# Patient Record
Sex: FEMALE | Race: WHITE | Employment: UNEMPLOYED | URBAN - METROPOLITAN AREA
[De-identification: names, ages, dates, MRNs, and addresses within clinical notes are randomized per-mention and may not be internally consistent; named-entity substitution may affect disease eponyms.]

---

## 2018-08-14 ENCOUNTER — HOSPITAL ENCOUNTER (EMERGENCY)
Age: 38
Discharge: HOME | End: 2018-08-14
Attending: OBSTETRICS & GYNECOLOGY | Admitting: OBSTETRICS & GYNECOLOGY
Payer: COMMERCIAL

## 2018-08-14 VITALS
BODY MASS INDEX: 33.74 KG/M2 | SYSTOLIC BLOOD PRESSURE: 124 MMHG | WEIGHT: 215 LBS | DIASTOLIC BLOOD PRESSURE: 82 MMHG | RESPIRATION RATE: 18 BRPM | HEART RATE: 100 BPM | TEMPERATURE: 98.1 F | HEIGHT: 67 IN

## 2018-08-14 PROBLEM — R61 SWEATING PROFUSELY: Status: ACTIVE | Noted: 2018-08-14

## 2018-08-14 LAB
ALBUMIN SERPL-MCNC: 2.9 G/DL (ref 3.5–5)
ALBUMIN/GLOB SERPL: 0.7 {RATIO} (ref 1.2–3.5)
ALP SERPL-CCNC: 93 U/L (ref 50–136)
ALT SERPL-CCNC: 21 U/L (ref 12–65)
ANION GAP SERPL CALC-SCNC: 10 MMOL/L (ref 7–16)
AST SERPL-CCNC: 13 U/L (ref 15–37)
BILIRUB SERPL-MCNC: 0.1 MG/DL (ref 0.2–1.1)
BUN SERPL-MCNC: 9 MG/DL (ref 6–23)
CALCIUM SERPL-MCNC: 9.1 MG/DL (ref 8.3–10.4)
CHLORIDE SERPL-SCNC: 104 MMOL/L (ref 98–107)
CO2 SERPL-SCNC: 20 MMOL/L (ref 21–32)
CREAT SERPL-MCNC: 0.74 MG/DL (ref 0.6–1)
ERYTHROCYTE [DISTWIDTH] IN BLOOD BY AUTOMATED COUNT: 14.4 %
GLOBULIN SER CALC-MCNC: 4.4 G/DL (ref 2.3–3.5)
GLUCOSE SERPL-MCNC: 94 MG/DL (ref 65–100)
GLUCOSE, GLUUPC: NEGATIVE
HCT VFR BLD AUTO: 38.9 % (ref 35.8–46.3)
HGB BLD-MCNC: 12.3 G/DL (ref 11.7–15.4)
KETONES UR-MCNC: NEGATIVE MG/DL
MCH RBC QN AUTO: 28.9 PG (ref 26.1–32.9)
MCHC RBC AUTO-ENTMCNC: 31.6 G/DL (ref 31.4–35)
MCV RBC AUTO: 91.5 FL (ref 79.6–97.8)
NRBC # BLD: 0 K/UL (ref 0–0.2)
PLATELET # BLD AUTO: 194 K/UL (ref 150–450)
PMV BLD AUTO: 10.4 FL (ref 9.4–12.3)
POTASSIUM SERPL-SCNC: 3.7 MMOL/L (ref 3.5–5.1)
PROT SERPL-MCNC: 7.3 G/DL (ref 6.3–8.2)
PROT UR QL: NEGATIVE
RBC # BLD AUTO: 4.25 M/UL (ref 4.05–5.2)
SODIUM SERPL-SCNC: 134 MMOL/L (ref 136–145)
WBC # BLD AUTO: 15.6 K/UL (ref 4.3–11.1)

## 2018-08-14 PROCEDURE — 74011250637 HC RX REV CODE- 250/637: Performed by: OBSTETRICS & GYNECOLOGY

## 2018-08-14 PROCEDURE — 87086 URINE CULTURE/COLONY COUNT: CPT

## 2018-08-14 PROCEDURE — 80053 COMPREHEN METABOLIC PANEL: CPT

## 2018-08-14 PROCEDURE — 81002 URINALYSIS NONAUTO W/O SCOPE: CPT | Performed by: OBSTETRICS & GYNECOLOGY

## 2018-08-14 PROCEDURE — 85027 COMPLETE CBC AUTOMATED: CPT

## 2018-08-14 PROCEDURE — 59025 FETAL NON-STRESS TEST: CPT

## 2018-08-14 PROCEDURE — 99285 EMERGENCY DEPT VISIT HI MDM: CPT

## 2018-08-14 RX ORDER — OXYCODONE HYDROCHLORIDE 5 MG/1
10 TABLET ORAL
Status: COMPLETED | OUTPATIENT
Start: 2018-08-14 | End: 2018-08-14

## 2018-08-14 RX ORDER — LORATADINE 10 MG/1
10 TABLET ORAL
COMMUNITY

## 2018-08-14 RX ORDER — CALCIUM CARBONATE 200(500)MG
1 TABLET,CHEWABLE ORAL DAILY
COMMUNITY

## 2018-08-14 RX ORDER — ACETAMINOPHEN 500 MG
1000 TABLET ORAL
COMMUNITY

## 2018-08-14 RX ADMIN — OXYCODONE HYDROCHLORIDE 10 MG: 5 TABLET ORAL at 20:10

## 2018-08-14 NOTE — IP AVS SNAPSHOT
Summary of Care Report The Summary of Care report has been created to help improve care coordination. Users with access to [employer] or [address] (Web-based application) may access additional patient information including the Discharge Summary. If you are not currently a [address] user and need more information, please call the number listed below in the [place] [address] section and ask to be connected with Medical Records. Facility Information Name Address Phone [address] [place] [address] [address] [place] [id number] [phone] Patient Information Patient Name Sex NEAL Diallo ([id number]) Female 1980 Discharge Information Admitting Provider Service Area Unit Nat Payne MD / [address] [facility] 4 Dimitry / [phone] Discharge Provider Discharge Date/Time Discharge Disposition Destination (none) (none) (none) (none) Patient Language Language ENGLISH [13] Hospital Problems as of 2018  Reviewed: 2018  6:27 PM by Nat Payne MD  
  
  
  
 Class Noted - Resolved Last Modified POA Active Problems Sweating profusely  2018 - Present 2018 by Nat Payne MD Unknown Entered by Nat Payne MD  
  
Non-Hospital Problems as of 2018  Reviewed: 2018  6:27 PM by Nat Payne MD  
 None You are allergic to the following Not on File Current Discharge Medication List  
  
ASK your doctor about these medications Dose & Instructions Dispensing Information Comments  
 calcium carbonate 200 mg calcium (500 mg) Chew Commonly known as:  TUMS Dose:  1 Tab Take 1 Tab by mouth daily. Refills:  0 CLARITIN 10 mg tablet Generic drug:  loratadine Dose:  10 mg Take 10 mg by mouth. Refills:  0 TYLENOL EXTRA STRENGTH 500 mg tablet Generic drug:  acetaminophen Dose:  1000 mg Take 1,000 mg by mouth every six (6) hours as needed for Pain. Refills:  0 Follow-up Information Follow up With Details Comments Contact [facility]  If symptoms worsen [address] 
[address] [place] [address] [phone] Discharge Instructions Pregnancy Precautions: Care Instructions Your Care Instructions There is no sure way to prevent labor before your due date ( labor) or to prevent most other pregnancy problems. But there are things you can do to increase your chances of a healthy pregnancy. Go to your appointments, follow your doctor's advice, and take good care of yourself. Eat well, and exercise (if your doctor agrees). And make sure to drink plenty of water. Follow-up care is a key part of your treatment and safety. Be sure to make and go to all appointments, and call your doctor if you are having problems. It's also a good idea to know your test results and keep a list of the medicines you take. How can you care for yourself at home? · Make sure you go to your prenatal appointments. At each visit, your doctor will check your blood pressure. Your doctor will also check to see if you have protein in your urine. High blood pressure and protein in urine are signs of preeclampsia. This condition can be dangerous for you and your baby. · Drink plenty of fluids, enough so that your urine is light yellow or clear like water. Dehydration can cause contractions. If you have kidney, heart, or liver disease and have to limit fluids, talk with your doctor before you increase the amount of fluids you drink. · Tell your doctor right away if you notice any symptoms of an infection, such as: ¨ Burning when you urinate. ¨ A foul-smelling discharge from your vagina. ¨ Vaginal itching. ¨ Unexplained fever. ¨ Unusual pain or soreness in your uterus or lower belly. · Eat a balanced diet. Include plenty of foods that are high in calcium and iron. ¨ Foods high in calcium include milk, cheese, yogurt, almonds, and broccoli. ¨ Foods high in iron include red meat, shellfish, poultry, eggs, beans, raisins, whole-grain bread, and leafy green vegetables. · Do not smoke. If you need help quitting, talk to your doctor about stop-smoking programs and medicines. These can increase your chances of quitting for good. · Do not drink alcohol or use illegal drugs. · Follow your doctor's directions about activity. Your doctor will let you know how much, if any, exercise you can do. · Ask your doctor if you can have sex. If you are at risk for early labor, your doctor may ask you to not have sex. · Take care to prevent falls. During pregnancy, your joints are loose, and your balance is off. Sports such as bicycling, skiing, or in-line skating can increase your risk of falling. And don't ride horses or motorcycles, dive, water ski, scuba dive, or parachute jump while you are pregnant. · Avoid getting very hot. Do not use saunas or hot tubs. Avoid staying out in the sun in hot weather for long periods. Take acetaminophen (Tylenol) to lower a high fever. · Do not take any over-the-counter or herbal medicines or supplements without talking to your doctor or pharmacist first. 
When should you call for help? Call 911 anytime you think you may need emergency care. For example, call if: 
  · You passed out (lost consciousness).  
  · You have severe vaginal bleeding.  
  · You have severe pain in your belly or pelvis.  
  · You have had fluid gushing or leaking from your vagina and you know or think the umbilical cord is bulging into your vagina. If this happens, immediately get down on your knees so your rear end (buttocks) is higher than your head. This will decrease the pressure on the cord until help arrives.  Call your doctor now or seek immediate medical care if: 
  · You have signs of preeclampsia, such as: 
¨ Sudden swelling of your face, hands, or feet. ¨ New vision problems (such as dimness or blurring). ¨ A severe headache.  
  · You have any vaginal bleeding.  
  · You have belly pain or cramping.  
  · You have a fever.  
  · You have had regular contractions (with or without pain) for an hour. This means that you have 8 or more within 1 hour or 4 or more in 20 minutes after you change your position and drink fluids.  
  · You have a sudden release of fluid from your vagina.  
  · You have low back pain or pelvic pressure that does not go away.  
  · You notice that your baby has stopped moving or is moving much less than normal.  
 Watch closely for changes in your health, and be sure to contact your doctor if you have any problems. Where can you learn more? Go to http://fredi-desean.info/. Enter [id number] in the search box to learn more about \"Pregnancy Precautions: Care Instructions. \" Current as of: November 21, 2017 Content Version: 11.7 © 7711-0166 [employer]. Care instructions adapted under license by [employer] (which disclaims liability or warranty for this information). If you have questions about a medical condition or this instruction, always ask your healthcare professional. [place] [address] any warranty or liability for your use of this information. Chart Review Routing History No Routing History on File

## 2018-08-14 NOTE — PROGRESS NOTES
Pt here with complaints of \"not feeling well\" pt states she has been having contractions and cramping; headache; some upper belly pain; pt vague when describing these symptoms; will notify MD of pt arrival.

## 2018-08-14 NOTE — IP AVS SNAPSHOT
[address] 
[phone] Patient: Yasemin Paiz 
MRN: [id number] About your hospitalization You were admitted on:  N/A You last received care in the:  [facility] 4 TAI You were discharged on:  2018 Why you were hospitalized Your primary diagnosis was:  Not on File Your diagnoses also included:  Sweating Profusely Follow-up Information Follow up With Details Comments Contact [facility]  If symptoms worsen [facility]  
[address] [place] [address] [phone] Discharge Orders None A check alden indicates which time of day the medication should be taken. My Medications ASK your doctor about these medications Instructions Each Dose to Equal  
 Morning Noon Evening Bedtime  
 calcium carbonate 200 mg calcium (500 mg) Chew Commonly known as:  TUMS Your last dose was: Your next dose is: Take 1 Tab by mouth daily. 1 Tab CLARITIN 10 mg tablet Generic drug:  loratadine Your last dose was: Your next dose is: Take 10 mg by mouth. 10 mg  
    
   
   
   
  
 TYLENOL EXTRA STRENGTH 500 mg tablet Generic drug:  acetaminophen Your last dose was: Your next dose is: Take 1,000 mg by mouth every six (6) hours as needed for Pain. 1000 mg Discharge Instructions Pregnancy Precautions: Care Instructions Your Care Instructions There is no sure way to prevent labor before your due date ( labor) or to prevent most other pregnancy problems. But there are things you can do to increase your chances of a healthy pregnancy. Go to your appointments, follow your doctor's advice, and take good care of yourself. Eat well, and exercise (if your doctor agrees). And make sure to drink plenty of water. Follow-up care is a key part of your treatment and safety. Be sure to make and go to all appointments, and call your doctor if you are having problems. It's also a good idea to know your test results and keep a list of the medicines you take. How can you care for yourself at home? · Make sure you go to your prenatal appointments. At each visit, your doctor will check your blood pressure. Your doctor will also check to see if you have protein in your urine. High blood pressure and protein in urine are signs of preeclampsia. This condition can be dangerous for you and your baby. · Drink plenty of fluids, enough so that your urine is light yellow or clear like water. Dehydration can cause contractions. If you have kidney, heart, or liver disease and have to limit fluids, talk with your doctor before you increase the amount of fluids you drink. · Tell your doctor right away if you notice any symptoms of an infection, such as: ¨ Burning when you urinate. ¨ A foul-smelling discharge from your vagina. ¨ Vaginal itching. ¨ Unexplained fever. ¨ Unusual pain or soreness in your uterus or lower belly. · Eat a balanced diet. Include plenty of foods that are high in calcium and iron. ¨ Foods high in calcium include milk, cheese, yogurt, almonds, and broccoli. ¨ Foods high in iron include red meat, shellfish, poultry, eggs, beans, raisins, whole-grain bread, and leafy green vegetables. · Do not smoke. If you need help quitting, talk to your doctor about stop-smoking programs and medicines. These can increase your chances of quitting for good. · Do not drink alcohol or use illegal drugs. · Follow your doctor's directions about activity. Your doctor will let you know how much, if any, exercise you can do. · Ask your doctor if you can have sex. If you are at risk for early labor, your doctor may ask you to not have sex. · Take care to prevent falls. During pregnancy, your joints are loose, and your balance is off. Sports such as bicycling, skiing, or in-line skating can increase your risk of falling. And don't ride horses or motorcycles, dive, water ski, scuba dive, or parachute jump while you are pregnant. · Avoid getting very hot. Do not use saunas or hot tubs. Avoid staying out in the sun in hot weather for long periods. Take acetaminophen (Tylenol) to lower a high fever. · Do not take any over-the-counter or herbal medicines or supplements without talking to your doctor or pharmacist first. 
When should you call for help? Call 911 anytime you think you may need emergency care. For example, call if: 
  · You passed out (lost consciousness).  
  · You have severe vaginal bleeding.  
  · You have severe pain in your belly or pelvis.  
  · You have had fluid gushing or leaking from your vagina and you know or think the umbilical cord is bulging into your vagina. If this happens, immediately get down on your knees so your rear end (buttocks) is higher than your head. This will decrease the pressure on the cord until help arrives.  
[facility] your doctor now or seek immediate medical care if: 
  · You have signs of preeclampsia, such as: 
¨ Sudden swelling of your face, hands, or feet. ¨ New vision problems (such as dimness or blurring). ¨ A severe headache.  
  · You have any vaginal bleeding.  
  · You have belly pain or cramping.  
  · You have a fever.  
  · You have had regular contractions (with or without pain) for an hour.  This means that you have 8 or more within 1 hour or 4 or more in 20 minutes after you change your position and drink fluids.  
  · You have a sudden release of fluid from your vagina.  
  · You have low back pain or pelvic pressure that does not go away.  
  · You notice that your baby has stopped moving or is moving much less than normal.  
  Watch closely for changes in your health, and be sure to contact your doctor if you have any problems. Where can you learn more? Go to http://fredi-desean.info/. Enter [id number] in the search box to learn more about \"Pregnancy Precautions: Care Instructions. \" Current as of: November 21, 2017 Content Version: 11.7 © 9921-3588 [employer]. Care instructions adapted under license by [employer] (which disclaims liability or warranty for this information). If you have questions about a medical condition or this instruction, always ask your healthcare professional. [address] any warranty or liability for your use of this information. Introducing [facility]! [facility] introduces [employer] patient portal. Now you can access parts of your medical record, email your doctor's office, and request medication refills online. 1. In your internet browser, go to [email or web address] [email or web address] 2. Click on the First Time User? Click Here link in the Sign In box. You will see the New Member Sign Up page. 3. Enter your [employer] Access Code exactly as it appears below. You will not need to use this code after youve completed the sign-up process. If you do not sign up before the expiration date, you must request a new code. · [employer] Access Code: [email or web address] Expires: 11/12/2018  7:35 PM 
 
4. Enter the last four digits of your Social Security Number (xxxx) and Date of Birth (mm/dd/yyyy) as indicated and click Submit. You will be taken to the next sign-up page. 5. Create a [employer] ID. This will be your [employer] login ID and cannot be changed, so think of one that is secure and easy to remember. 6. Create a [employer] password. You can change your password at any time. 7. Enter your Password Reset Question and Answer. This can be used at a later time if you forget your password. 8. Enter your e-mail address. You will receive e-mail notification when new information is available in [address] 9. Click Sign Up. You can now view and download portions of your medical record. 10. Click the Download Summary menu link to download a portable copy of your medical information. If you have questions, please visit the Frequently Asked Questions section of the [employer] website. Remember, [employer] is NOT to be used for urgent needs. For medical emergencies, dial 911. Now available from your iPhone and Android! Introducing Flip George As a [employer] patient, I wanted to make you aware of our electronic visit tool called Flip George. SoWeTrip/7 allows you to connect within minutes with a medical provider 24 hours a day, seven days a week via a mobile device or tablet or logging into a secure website from your computer. You can access Flip George from anywhere in the [place]. A virtual visit might be right for you when you have a simple condition and feel like you just dont want to get out of bed, or cant get away from work for an appointment, when your regular [facility] provider is not available (evenings, weekends or holidays), or when youre out of town and need minor care. Electronic visits cost only $49 and if the [employer] provider determines a prescription is needed to treat your condition, one can be electronically transmitted to a nearby pharmacy*. Please take a moment to enroll today if you have not already done so. The enrollment process is free and takes just a few minutes. To enroll, please download the [employer] eddie to your tablet or phone, or visit [email or web address] to enroll on your computer.    
And, as an [address] [place] [address] patient with a [email or web address] account, the results of your visits will be scanned into your electronic medical record and your primary care provider will be able to view the scanned results. We urge you to continue to see your regular [employer] provider for your ongoing medical care. And while your primary care provider may not be the one available when you seek a Flip George virtual visit, the peace of mind you get from getting a real diagnosis real time can be priceless. For more information on Flip George, view our Frequently Asked Questions (FAQs) at [email or web address]. Sincerely, 
 
Ritu Morgan MD 
Chief Medical Officer [facility] Yuridia Schofield *:  certain medications cannot be prescribed via Flip George Providers Seen During Your Hospitalization Provider Specialty Primary office phone Martha Hopkins MD Obstetrics & Gynecology [phone] Your Primary Care Physician (PCP) Primary Care Physician Office Phone Office Fax UNKNOWN, PROVIDER ** None ** ** None ** You are allergic to the following Not on File Recent Documentation Height Weight BMI OB Status Smoking Status 1.702 m 97.5 kg 33.67 kg/m2 Pregnant Never Smoker Patient Belongings The following personal items are in your possession at time of discharge: 
                             
 
  
  
 Please provide this summary of care documentation to your next provider. Signatures-by signing, you are acknowledging that this After Visit Summary has been reviewed with you and you have received a copy. Patient Signature:  ____________________________________________________________ Date:  ____________________________________________________________  
  
Patrick Patel Provider Signature:  ____________________________________________________________ Date:  ____________________________________________________________

## 2018-08-14 NOTE — IP AVS SNAPSHOT
[address] 
[phone] Patient: Bharti Ambriz 
MRN: [id number] XCK:[birth year] A check alden indicates which time of day the medication should be taken. My Medications ASK your doctor about these medications Instructions Each Dose to Equal  
 Morning Noon Evening Bedtime  
 calcium carbonate 200 mg calcium (500 mg) Chew Commonly known as:  TUMS Your last dose was: Your next dose is: Take 1 Tab by mouth daily. 1 Tab CLARITIN 10 mg tablet Generic drug:  loratadine Your last dose was: Your next dose is: Take 10 mg by mouth. 10 mg  
    
   
   
   
  
 TYLENOL EXTRA STRENGTH 500 mg tablet Generic drug:  acetaminophen Your last dose was: Your next dose is: Take 1,000 mg by mouth every six (6) hours as needed for Pain.   
 1000 mg

## 2018-08-14 NOTE — H&P
Chief Complaint   Patient presents with    Pregnancy Problem     31w6d       41 y.o. female at 31w6d  weeks gestation who is seen for not feeling well. States she has been having abdominal cramping? Pain? For several days. That it usually goes away with drinking more fluid. Has heartburn, but not right now. Urinates frequently. Was walking through store and felt sweaty and lightheaded so came to be seen. No fever/vomiting/headache/vision changes/vag bleeding. Appetite and bm fine. Fetal movement has beennormal .    HISTORY:  OB History    Para Term  AB Living   2    1    SAB TAB Ectopic Molar Multiple Live Births              # Outcome Date GA Lbr Bro/2nd Weight Sex Delivery Anes PTL Lv   2 Current            1 AB                   History   Sexual Activity    Sexual activity: Yes     No LMP recorded. Patient is pregnant. Social History     Social History    Marital status: UNKNOWN     Spouse name: N/A    Number of children: N/A    Years of education: N/A     Occupational History    Not on file. Social History Main Topics    Smoking status: Never Smoker    Smokeless tobacco: Never Used    Alcohol use No    Drug use: No    Sexual activity: Yes     Other Topics Concern    Not on file     Social History Narrative    No narrative on file       History reviewed. No pertinent surgical history. History reviewed. No pertinent past medical history. ROS:  Negative:   negative 10 point ROS except as noted in HPI    Positive:   per hpi    PHYSICAL EXAM:  Blood pressure 139/90, pulse (!) 107, temperature 98.1 °F (36.7 °C), resp. rate 18, height 5' 7\" (1.702 m), weight 97.5 kg (215 lb). The patient appears well, alert, oriented x 3. Appropriate affect. Lungs are clear. Heart RRR, no murmurs. Abdomen soft, on my exam was ttp in ruq, when checked by nurses was tender luq, no rebound/guarding. no cvat.   Fundus soft and non tender  Skin warm, dry, no rashes  Ext 1 + edema, DTR's normal    Cervix: long/closed/high    Fetal Heart Rate: cat 1 tracing   Uterine contractions: rare    Urine dip clear, completelly negative    Assessment:  41 y.o. female at 32w8d, vague complaints    Plan:  Check cbc/comp met. Monitor bp. Eboni Johnson MD    Lab Results   Component Value Date/Time    Sodium 134 (L) 08/14/2018 06:49 PM    Potassium 3.7 08/14/2018 06:49 PM    Chloride 104 08/14/2018 06:49 PM    CO2 20 (L) 08/14/2018 06:49 PM    Anion gap 10 08/14/2018 06:49 PM    Glucose 94 08/14/2018 06:49 PM    BUN 9 08/14/2018 06:49 PM    Creatinine 0.74 08/14/2018 06:49 PM    GFR est AA >60 08/14/2018 06:49 PM    GFR est non-AA >60 08/14/2018 06:49 PM    Calcium 9.1 08/14/2018 06:49 PM    Bilirubin, total 0.1 (L) 08/14/2018 06:49 PM    AST (SGOT) 13 (L) 08/14/2018 06:49 PM    Alk. phosphatase 93 08/14/2018 06:49 PM    Protein, total 7.3 08/14/2018 06:49 PM    Albumin 2.9 (L) 08/14/2018 06:49 PM    Globulin 4.4 (H) 08/14/2018 06:49 PM    A-G Ratio 0.7 (L) 08/14/2018 06:49 PM    ALT (SGPT) 21 08/14/2018 06:49 PM     Lab Results   Component Value Date/Time    WBC 15.6 (H) 08/14/2018 06:49 PM    HGB 12.3 08/14/2018 06:49 PM    HCT 38.9 08/14/2018 06:49 PM    PLATELET 891 20/22/8407 06:49 PM    MCV 91.5 08/14/2018 06:49 PM     Discussed results with patient. Wbc count slightly elevated but otherwise labs essentially normal. Reviewed symptoms again with patient. Other than frequent urination, nothing focal. Will send urine culture. Patient cautioned to monitor for fever, and return for new or worsening sx. Discharged with oxycodone 10 given here. Eboni Johnson MD

## 2018-08-15 NOTE — PROGRESS NOTES
patient discharged home self care. Patient verbalizes understanding of discharge instructions. Patient ambulated out with spouse.

## 2018-08-17 LAB
BACTERIA SPEC CULT: NORMAL
SERVICE CMNT-IMP: NORMAL

## 2018-10-04 ENCOUNTER — HOSPITAL ENCOUNTER (INPATIENT)
Age: 38
LOS: 4 days | Discharge: HOME | End: 2018-10-08
Attending: OBSTETRICS & GYNECOLOGY | Admitting: OBSTETRICS & GYNECOLOGY
Payer: COMMERCIAL

## 2018-10-04 DIAGNOSIS — G89.18 POSTOPERATIVE PAIN: Primary | ICD-10-CM

## 2018-10-04 PROBLEM — Z34.90 ENCOUNTER FOR PLANNED INDUCTION OF LABOR: Status: ACTIVE | Noted: 2018-10-04

## 2018-10-04 PROBLEM — Z3A.39 39 WEEKS GESTATION OF PREGNANCY: Status: ACTIVE | Noted: 2018-10-04

## 2018-10-04 LAB
ABO + RH BLD: NORMAL
BLOOD GROUP ANTIBODIES SERPL: NORMAL
ERYTHROCYTE [DISTWIDTH] IN BLOOD BY AUTOMATED COUNT: 15.3 %
HCT VFR BLD AUTO: 40.4 % (ref 35.8–46.3)
HGB BLD-MCNC: 12.7 G/DL (ref 11.7–15.4)
MCH RBC QN AUTO: 28.8 PG (ref 26.1–32.9)
MCHC RBC AUTO-ENTMCNC: 31.4 G/DL (ref 31.4–35)
MCV RBC AUTO: 91.6 FL (ref 79.6–97.8)
NRBC # BLD: 0 K/UL (ref 0–0.2)
PLATELET # BLD AUTO: 167 K/UL (ref 150–450)
PMV BLD AUTO: 10.6 FL (ref 9.4–12.3)
RBC # BLD AUTO: 4.41 M/UL (ref 4.05–5.2)
SPECIMEN EXP DATE BLD: NORMAL
WBC # BLD AUTO: 9.7 K/UL (ref 4.3–11.1)

## 2018-10-04 PROCEDURE — 74011250637 HC RX REV CODE- 250/637: Performed by: OBSTETRICS & GYNECOLOGY

## 2018-10-04 PROCEDURE — 86901 BLOOD TYPING SEROLOGIC RH(D): CPT

## 2018-10-04 PROCEDURE — 85027 COMPLETE CBC AUTOMATED: CPT

## 2018-10-04 PROCEDURE — 65270000029 HC RM PRIVATE

## 2018-10-04 PROCEDURE — 4A1HXCZ MONITORING OF PRODUCTS OF CONCEPTION, CARDIAC RATE, EXTERNAL APPROACH: ICD-10-PCS | Performed by: OBSTETRICS & GYNECOLOGY

## 2018-10-04 RX ORDER — SODIUM CHLORIDE 0.9 % (FLUSH) 0.9 %
5-10 SYRINGE (ML) INJECTION EVERY 8 HOURS
Status: DISCONTINUED | OUTPATIENT
Start: 2018-10-04 | End: 2018-10-07 | Stop reason: ALTCHOICE

## 2018-10-04 RX ORDER — OXYTOCIN/RINGER'S LACTATE 30/500 ML
0-25 PLASTIC BAG, INJECTION (ML) INTRAVENOUS
Status: DISCONTINUED | OUTPATIENT
Start: 2018-10-04 | End: 2018-10-04

## 2018-10-04 RX ORDER — ZOLPIDEM TARTRATE 5 MG/1
5 TABLET ORAL
Status: DISCONTINUED | OUTPATIENT
Start: 2018-10-04 | End: 2018-10-08 | Stop reason: HOSPADM

## 2018-10-04 RX ORDER — LIDOCAINE HYDROCHLORIDE 20 MG/ML
JELLY TOPICAL
Status: ACTIVE | OUTPATIENT
Start: 2018-10-04 | End: 2018-10-05

## 2018-10-04 RX ORDER — SODIUM CHLORIDE 0.9 % (FLUSH) 0.9 %
5-10 SYRINGE (ML) INJECTION AS NEEDED
Status: DISCONTINUED | OUTPATIENT
Start: 2018-10-04 | End: 2018-10-07 | Stop reason: ALTCHOICE

## 2018-10-04 RX ORDER — LIDOCAINE HYDROCHLORIDE 10 MG/ML
1 INJECTION INFILTRATION; PERINEURAL
Status: ACTIVE | OUTPATIENT
Start: 2018-10-04 | End: 2018-10-05

## 2018-10-04 RX ORDER — OXYTOCIN/RINGER'S LACTATE 30/500 ML
0-25 PLASTIC BAG, INJECTION (ML) INTRAVENOUS
Status: DISCONTINUED | OUTPATIENT
Start: 2018-10-05 | End: 2018-10-05 | Stop reason: HOSPADM

## 2018-10-04 RX ORDER — OXYTOCIN/0.9 % SODIUM CHLORIDE 15/250 ML
250 PLASTIC BAG, INJECTION (ML) INTRAVENOUS ONCE
Status: ACTIVE | OUTPATIENT
Start: 2018-10-04 | End: 2018-10-05

## 2018-10-04 RX ORDER — BUTORPHANOL TARTRATE 1 MG/ML
1 INJECTION INTRAMUSCULAR; INTRAVENOUS
Status: DISCONTINUED | OUTPATIENT
Start: 2018-10-04 | End: 2018-10-05 | Stop reason: HOSPADM

## 2018-10-04 RX ORDER — MINERAL OIL
120 OIL (ML) ORAL
Status: ACTIVE | OUTPATIENT
Start: 2018-10-04 | End: 2018-10-05

## 2018-10-04 RX ORDER — DEXTROSE, SODIUM CHLORIDE, SODIUM LACTATE, POTASSIUM CHLORIDE, AND CALCIUM CHLORIDE 5; .6; .31; .03; .02 G/100ML; G/100ML; G/100ML; G/100ML; G/100ML
125 INJECTION, SOLUTION INTRAVENOUS CONTINUOUS
Status: DISCONTINUED | OUTPATIENT
Start: 2018-10-04 | End: 2018-10-08 | Stop reason: ALTCHOICE

## 2018-10-04 RX ADMIN — MISOPROSTOL 50 MCG: 100 TABLET ORAL at 23:36

## 2018-10-04 RX ADMIN — MISOPROSTOL 25 MCG: 100 TABLET ORAL at 19:41

## 2018-10-04 RX ADMIN — ZOLPIDEM TARTRATE 5 MG: 5 TABLET ORAL at 23:36

## 2018-10-04 NOTE — PROGRESS NOTES
10/04/18 1936 Cervical Exam  
Dilation (cm) 0 (closed) Eff 30 % Cervical Consistency Moderate Vaginal exam done by? Yahir Nguyen RN Initial dose of cytotec 25mcg administered.

## 2018-10-04 NOTE — IP AVS SNAPSHOT
[id number]  [address] 
[phone] Patient: Jaime Freedman MRN: [id number] XKO:[id number] About your hospitalization You were admitted on:  October 4, 2018 You last received care in the:  [address] [facility] You were discharged on:  October 8, 2018 Why you were hospitalized Your primary diagnosis was:  Encounter For Planned Induction Of Labor Your diagnoses also included:  39 Weeks Gestation Of Pregnancy, [place] (Advanced Maternal Age) Multigravida 35+, Unspecified Trimester Follow-up Information Follow up With Details Comments Contact Info Boris Jackson MD Schedule an appointment as soon as possible for a visit in 7 weeks  [address] [facility] [address] [phone] Jennifer Wiseman MD   [address] [place] [address] [phone] Discharge Orders None A check alden indicates which time of day the medication should be taken. My Medications START taking these medications Instructions Each Dose to Equal  
 Morning Noon Evening Bedtime  
 docusate sodium 100 mg capsule Commonly known as:  Revonda Claw Your last dose was: Your next dose is: Take 1 Cap by mouth two (2) times a day for 90 days. 100 mg  
    
   
   
   
  
 ibuprofen 600 mg tablet Commonly known as:  MOTRIN Your last dose was: Your next dose is: Take 1 Tab by mouth every six (6) hours as needed for Pain. 600 mg  
    
   
   
   
  
 oxyCODONE-acetaminophen 7.5-325 mg per tablet Commonly known as:  PERCOCET 7.5 Your last dose was: Your next dose is: Take 1 Tab by mouth every six (6) hours as needed. Max Daily Amount: 4 Tabs. 1 Tab CONTINUE taking these medications Instructions Each Dose to Equal  
 Morning Noon Evening Bedtime calcium carbonate 200 mg calcium (500 mg) Chew Commonly known as:  TUMS Your last dose was: Your next dose is: Take 1 Tab by mouth daily. 1 Tab CLARITIN 10 mg tablet Generic drug:  loratadine Your last dose was: Your next dose is: Take 10 mg by mouth. 10 mg  
    
   
   
   
  
 TYLENOL EXTRA STRENGTH 500 mg tablet Generic drug:  acetaminophen Your last dose was: Your next dose is: Take 1,000 mg by mouth every six (6) hours as needed for Pain. 1000 mg Where to Get Your Medications Information on where to get these meds will be given to you by the nurse or doctor. ! Ask your nurse or doctor about these medications  
  docusate sodium 100 mg capsule  
 ibuprofen 600 mg tablet  
 oxyCODONE-acetaminophen 7.5-325 mg per tablet Opioid Education Prescription Opioids: What You Need to Know: 
 
Prescription opioids can be used to help relieve moderate-to-severe pain and are often prescribed following a surgery or injury, or for certain health conditions. These medications can be an important part of treatment but also come with serious risks. Opioids are strong pain medicines. Examples include hydrocodone, oxycodone, fentanyl, and morphine. Heroin is an example of an illegal opioid. It is important to work with your health care provider to make sure you are getting the safest, most effective care. WHAT ARE THE RISKS AND SIDE EFFECTS OF OPIOID USE? Prescription opioids carry serious risks of addiction and overdose, especially with prolonged use. An opioid overdose, often marked by slow breathing, can cause sudden death. The use of prescription opioids can have a number of side effects as well, even when taken as directed. · Tolerance-meaning you might need to take more of a medication for the same pain relief · Physical dependence-meaning you have symptoms of withdrawal when the medication is stopped. Withdrawal symptoms can include nausea, sweating, chills, diarrhea, stomach cramps, and muscle aches. Withdrawal can last up to several weeks, depending on which drug you took and how long you took it. · Increased sensitivity to pain · Constipation · Nausea, vomiting, and dry mouth · Sleepiness and dizziness · Confusion · Depression · Low levels of testosterone that can result in lower sex drive, energy, and strength · Itching and sweating RISKS ARE GREATER WITH:      
· History of drug misuse, substance use disorder, or overdose · Mental health conditions (such as depression or anxiety) · Sleep apnea · Older age (72 years or older) · Pregnancy Avoid alcohol while taking prescription opioids. Also, unless specifically advised by your health care provider, medications to avoid include: · Benzodiazepines (such as Xanax or Valium) · Muscle relaxants (such as Soma or Flexeril) · Hypnotics (such as Ambien or Lunesta) · Other prescription opioids KNOW YOUR OPTIONS Talk to your health care provider about ways to manage your pain that don't involve prescription opioids. Some of these options may actually work better and have fewer risks and side effects. Consult your physician before adding or stopping any medications, treatments, or physical activity. Options may include: 
· Pain relievers such as acetaminophen, ibuprofen, and naproxen · Some medications that are also used for depression or seizures · Physical therapy and exercise · Counseling to help patients learn how to cope better with triggers of pain and stress. · Application of heat or cold compress · Massage therapy · Relaxation techniques Be Informed Make sure you know the name of your medication, how much and how often to take it, and its potential risks & side effects.  
 
IF YOU ARE PRESCRIBED OPIOIDS FOR PAIN: 
 · Never take opioids in greater amounts or more often than prescribed. Remember the goal is not to be pain-free but to manage your pain at a tolerable level. · Follow up with your primary care provider to: · Work together to create a plan on how to manage your pain. · Talk about ways to help manage your pain that don't involve prescription opioids. · Talk about any and all concerns and side effects. · Help prevent misuse and abuse. · Never sell or share prescription opioids · Help prevent misuse and abuse. · Store prescription opioids in a secure place and out of reach of others (this may include visitors, children, friends, and family). · Safely dispose of unused/unwanted prescription opioids: Find your community drug take-back program or your pharmacy mail-back program, or flush them down the toilet, following guidance from the Food and Drug Administration (www.fda.gov/Drugs/ResourcesForYou). · Visit www.cdc.gov/drugoverdose to learn about the risks of opioid abuse and overdose. · If you believe you may be struggling with addiction, tell your health care provider and ask for guidance or call [employer] at 3-267-068-UKJF. Discharge Instructions  Section: What to Expect at [facility] Your Recovery A  section, or , is surgery to deliver your baby through a cut, called an incision, that the doctor makes in your lower belly and uterus. You may have some pain in your lower belly and need pain medicine for 1 to 2 weeks. You can expect some vaginal bleeding for several weeks. You will probably need about 6 weeks to fully recover. It is important to take it easy while the incision is healing. Avoid heavy lifting, strenuous activities, or exercises that strain the belly muscles while you are recovering. Ask a family member or friend for help with housework, cooking, and shopping. This care sheet gives you a general idea about how long it will take for you to recover. But each person recovers at a different pace. Follow the steps below to get better as quickly as possible. How can you care for yourself at home? Activity 
  · Rest when you feel tired. Getting enough sleep will help you recover.  
  · Try to walk each day. Start by walking a little more than you did the day before. Bit by bit, increase the amount you walk. Walking boosts blood flow and helps prevent pneumonia, constipation, and blood clots.  
  · Avoid strenuous activities, such as bicycle riding, jogging, weightlifting, and aerobic exercise, for 6 weeks or until your doctor says it is okay.  
  · Until your doctor says it is okay, do not lift anything heavier than your baby.  
  · Do not do sit-ups or other exercises that strain the belly muscles for 6 weeks or until your doctor says it is okay.  
  · Hold a pillow over your incision when you cough or take deep breaths. This will support your belly and decrease your pain.  
  · You may shower as usual. Pat the incision dry when you are done.  
  · You will have some vaginal bleeding. Wear sanitary pads. Do not douche or use tampons until your doctor says it is okay.  
  · Ask your doctor when you can drive again.  
  · You will probably need to take at least 6 weeks off work. It depends on the type of work you do and how you feel.  
  · Ask your doctor when it is okay for you to have sex. Diet 
  · You can eat your normal diet. If your stomach is upset, try bland, low-fat foods like plain rice, broiled chicken, toast, and yogurt.  
  · Drink plenty of fluids (unless your doctor tells you not to).  
  · You may notice that your bowel movements are not regular right after your surgery. This is common. Try to avoid constipation and straining with bowel movements. You may want to take a fiber supplement every day.  If you have not had a bowel movement after a couple of days, ask your doctor about taking a mild laxative.  
  · If you are breastfeeding, limit alcohol. Alcohol can cause a lack of energy and other health problems for the baby when a breastfeeding woman drinks heavily. It can also get in the way of a mom's ability to feed her baby or to care for the child in other ways. There isn't a lot of research about exactly how much alcohol can harm a baby. Having no alcohol is the safest choice for your baby. If you choose to have a drink now and then, have only one drink, and limit the number of occasions that you have a drink. Wait to breastfeed at least 2 hours after you have a drink to reduce the amount of alcohol the baby may get in the milk. Medicines 
  · Your doctor will tell you if and when you can restart your medicines. He or she will also give you instructions about taking any new medicines.  
  · If you take blood thinners, such as warfarin (Coumadin), clopidogrel (Plavix), or aspirin, be sure to talk to your doctor. He or she will tell you if and when to start taking those medicines again. Make sure that you understand exactly what your doctor wants you to do.  
  · Take pain medicines exactly as directed. ¨ If the doctor gave you a prescription medicine for pain, take it as prescribed. ¨ If you are not taking a prescription pain medicine, ask your doctor if you can take an over-the-counter medicine.  
  · If you think your pain medicine is making you sick to your stomach: 
¨ Take your medicine after meals (unless your doctor has told you not to). ¨ Ask your doctor for a different pain medicine.  
  · If your doctor prescribed antibiotics, take them as directed. Do not stop taking them just because you feel better. You need to take the full course of antibiotics. Incision care 
  · If you have strips of tape on the incision, leave the tape on for a week or until it falls off.   · Wash the area daily with warm, soapy water, and pat it dry. Don't use hydrogen peroxide or alcohol, which can slow healing. You may cover the area with a gauze bandage if it weeps or rubs against clothing. Change the bandage every day.  
  · Keep the area clean and dry. Other instructions 
  · If you breastfeed your baby, you may be more comfortable while you are healing if you place the baby so that he or she is not resting on your belly. Try tucking your baby under your arm, with his or her body along the side you will be feeding on. Support your baby's upper body with your arm. With that hand you can control your baby's head to bring his or her mouth to your breast. This is sometimes called the [address] hold. Follow-up care is a key part of your treatment and safety. Be sure to make and go to all appointments, and call your doctor if you are having problems. It's also a good idea to know your test results and keep a list of the medicines you take. When should you call for help? Call 911 anytime you think you may need emergency care. For example, call if: 
  · You passed out (lost consciousness).  
  · You have chest pain, are short of breath, or cough up blood.  
 Call your doctor now or seek immediate medical care if: 
  · You have pain that does not get better after you take pain medicine.  
  · You have severe vaginal bleeding.  
  · You are dizzy or lightheaded, or you feel like you may faint.  
  · You have new or worse pain in your belly or pelvis.  
  · You have loose stitches, or your incision comes open.  
  · You have symptoms of infection, such as: 
¨ Increased pain, swelling, warmth, or redness. ¨ Red streaks leading from the incision. ¨ Pus draining from the incision. ¨ A fever.  
  · You have symptoms of a blood clot in your leg (called a deep vein thrombosis), such as: 
¨ Pain in your calf, back of the knee, thigh, or groin. ¨ Redness and swelling in your leg or groin.  Watch closely for changes in your health, and be sure to contact your doctor if: 
  · You do not get better as expected. Where can you learn more? Go to http://fredi-desean.info/. Enter M806 in the search box to learn more about \" Section: What to Expect at Home. \" Current as of: 2017 Content Version: 11.8 © 2187-5762 [employer]. Care instructions adapted under license by [employer] (which disclaims liability or warranty for this information). If you have questions about a medical condition or this instruction, always ask your healthcare professional. [address] any warranty or liability for your use of this information. Youbetme Announcement We are excited to announce that we are making your provider's discharge notes available to you in Youbetme. You will see these notes when they are completed and signed by the physician that discharged you from your recent hospital stay. If you have any questions or concerns about any information you see in Youbetme, please call the Health Information Department where you were seen or reach out to your Primary Care Provider for more information about your plan of care. Introducing [facility]! [employer] introduces Youbetme patient portal. Now you can access parts of your medical record, email your doctor's office, and request medication refills online. 1. In your internet browser, go to [email or web address] 2. Click on the First Time User? Click Here link in the Sign In box. You will see the New Member Sign Up page. 3. Enter your Youbetme Access Code exactly as it appears below. You will not need to use this code after youve completed the sign-up process. If you do not sign up before the expiration date, you must request a new code. · Youbetme Access Code: [email or web address] Expires: 2018  7:35 PM 
 
 4. Enter the last four digits of your Social Security Number (xxxx) and Date of Birth (mm/dd/yyyy) as indicated and click Submit. You will be taken to the next sign-up page. 5. Create a [employer] ID. This will be your [employer] login ID and cannot be changed, so think of one that is secure and easy to remember. 6. Create a [employer] password. You can change your password at any time. 7. Enter your Password Reset Question and Answer. This can be used at a later time if you forget your password. 8. Enter your e-mail address. You will receive e-mail notification when new information is available in [address] 9. Click Sign Up. You can now view and download portions of your medical record. 10. Click the Download Summary menu link to download a portable copy of your medical information. If you have questions, please visit the Frequently Asked Questions section of the [employer] website. Remember, [employer] is NOT to be used for urgent needs. For medical emergencies, dial 911. Now available from your iPhone and Android! Introducing Flip George As a [employer] patient, I wanted to make you aware of our electronic visit tool called Flip CalvoMeetLinkshare. [employer] 24/7 allows you to connect within minutes with a medical provider 24 hours a day, seven days a week via a mobile device or tablet or logging into a secure website from your computer. You can access Flip George from anywhere in the [place]. A virtual visit might be right for you when you have a simple condition and feel like you just dont want to get out of bed, or cant get away from work for an appointment, when your regular [employer] provider is not available (evenings, weekends or holidays), or when youre out of town and need minor care.   Electronic visits cost only $49 and if the Flip George provider determines a prescription is needed to treat your condition, one can be electronically transmitted to a nearby pharmacy*. Please take a moment to enroll today if you have not already done so. The enrollment process is free and takes just a few minutes. To enroll, please download the [employer] 24/7 eddie to your tablet or phone, or visit [email or web address] to enroll on your computer. And, as an [address] [place] [address] patient with a [employer] account, the results of your visits will be scanned into your electronic medical record and your primary care provider will be able to view the scanned results. We urge you to continue to see your regular [employer] provider for your ongoing medical care. And while your primary care provider may not be the one available when you seek a ClearApp virtual visit, the peace of mind you get from getting a real diagnosis real time can be priceless. For more information on ClearApp, view our Frequently Asked Questions (FAQs) at [email or web address]. Sincerely, 
 
Олег Zabala MD 
Chief Medical Officer [facility] Yuridia Chandu *:  certain medications cannot be prescribed via ClearApp Providers Seen During Your Hospitalization Provider Specialty Primary office phone Db Warner MD Obstetrics & Gynecology [phone] Immunizations Administered for This Admission Name Date Influenza Vaccine (Quad) PF  Deferred () Tdap  Deferred () Your Primary Care Physician (PCP) Primary Care Physician Office Phone Office Fax [address] [place] [address] Blanca Kenzie  [phone] You are allergic to the following No active allergies Recent Documentation Breastfeeding? OB Status Smoking Status Yes Recent pregnancy Never Smoker Emergency Contacts Name Discharge Info Relation Home Work Mobile Tyree Grissom  Spouse [3] [phone] Patient Belongings The following personal items are in your possession at time of discharge: 
  Dental Appliances: None  Visual Aid: Contacts, Glasses      Home Medications: None   Jewelry: None  Clothing: Undergarments, Pants, Shirt, With patient    Other Valuables: Cell Phone, Contact Lenses, Eyeglasses, Camera, [place], [address], With patient Please provide this summary of care documentation to your next provider. Signatures-by signing, you are acknowledging that this After Visit Summary has been reviewed with you and you have received a copy. Patient Signature:  ____________________________________________________________ Date:  ____________________________________________________________  
  
[facility] Provider Signature:  ____________________________________________________________ Date:  ____________________________________________________________

## 2018-10-04 NOTE — PROGRESS NOTES
I spoke with Dr. Donnie Lesch. He would like Pitocin started at 0600 so hold the third dose of cytotec if the timing would be too close. Pt may have an epidural if she kicks into active labor.

## 2018-10-04 NOTE — IP AVS SNAPSHOT
[address] [place]  
[phone] Patient: Jaime Freedman MRN: [id number] YFT:[birth year] A check alden indicates which time of day the medication should be taken. My Medications START taking these medications Instructions Each Dose to Equal  
 Morning Noon Evening Bedtime  
 docusate sodium 100 mg capsule Commonly known as:  Revonda Claw Your last dose was: Your next dose is: Take 1 Cap by mouth two (2) times a day for 90 days. 100 mg  
    
   
   
   
  
 ibuprofen 600 mg tablet Commonly known as:  MOTRIN Your last dose was: Your next dose is: Take 1 Tab by mouth every six (6) hours as needed for Pain. 600 mg  
    
   
   
   
  
 oxyCODONE-acetaminophen 7.5-325 mg per tablet Commonly known as:  PERCOCET 7.5 Your last dose was: Your next dose is: Take 1 Tab by mouth every six (6) hours as needed. Max Daily Amount: 4 Tabs. 1 Tab CONTINUE taking these medications Instructions Each Dose to Equal  
 Morning Noon Evening Bedtime  
 calcium carbonate 200 mg calcium (500 mg) Chew Commonly known as:  TUMS Your last dose was: Your next dose is: Take 1 Tab by mouth daily. 1 Tab CLARITIN 10 mg tablet Generic drug:  loratadine Your last dose was: Your next dose is: Take 10 mg by mouth. 10 mg  
    
   
   
   
  
 TYLENOL EXTRA STRENGTH 500 mg tablet Generic drug:  acetaminophen Your last dose was: Your next dose is: Take 1,000 mg by mouth every six (6) hours as needed for Pain. 1000 mg Where to Get Your Medications Information on where to get these meds will be given to you by the nurse or doctor. ! Ask your nurse or doctor about these medications docusate sodium 100 mg capsule  
 ibuprofen 600 mg tablet  
 oxyCODONE-acetaminophen 7.5-325 mg per tablet

## 2018-10-05 ENCOUNTER — ANESTHESIA EVENT (OUTPATIENT)
Dept: LABOR AND DELIVERY | Age: 38
End: 2018-10-05
Payer: COMMERCIAL

## 2018-10-05 ENCOUNTER — ANESTHESIA (OUTPATIENT)
Dept: LABOR AND DELIVERY | Age: 38
End: 2018-10-05
Payer: COMMERCIAL

## 2018-10-05 PROBLEM — O09.529 AMA (ADVANCED MATERNAL AGE) MULTIGRAVIDA 35+, UNSPECIFIED TRIMESTER: Status: ACTIVE | Noted: 2018-10-05

## 2018-10-05 LAB
BASE DEFICIT BLDCOA-SCNC: 5.9 MMOL/L (ref 0–2)
BASE DEFICIT BLDCOV-SCNC: 5 MMOL/L (ref 1.9–7.7)
BDY SITE: ABNORMAL
BDY SITE: ABNORMAL
HCO3 BLDCOA-SCNC: 21 MMOL/L (ref 22–26)
HCO3 BLDV-SCNC: 20 MMOL/L
PCO2 BLDCOA: 44 MMHG (ref 33–49)
PCO2 BLDCOV: 39 MMHG (ref 14.1–43.3)
PH BLDCOA: 7.29 [PH] (ref 7.21–7.31)
PH BLDCOV: 7.34 [PH] (ref 7.2–7.44)
PO2 BLDCOA: 29 MMHG (ref 9–19)
PO2 BLDV: 26 MMHG (ref 30.4–57.2)
SERVICE CMNT-IMP: ABNORMAL
SERVICE CMNT-IMP: ABNORMAL

## 2018-10-05 PROCEDURE — 75410000003 HC RECOV DEL/VAG/CSECN EA 0.5 HR

## 2018-10-05 PROCEDURE — 74011000250 HC RX REV CODE- 250: Performed by: OBSTETRICS & GYNECOLOGY

## 2018-10-05 PROCEDURE — 74011250636 HC RX REV CODE- 250/636

## 2018-10-05 PROCEDURE — 76060000078 HC EPIDURAL ANESTHESIA: Performed by: OBSTETRICS & GYNECOLOGY

## 2018-10-05 PROCEDURE — 82803 BLOOD GASES ANY COMBINATION: CPT

## 2018-10-05 PROCEDURE — 76010000392 HC C SECN EA ADDL 0.5 HR: Performed by: OBSTETRICS & GYNECOLOGY

## 2018-10-05 PROCEDURE — 77030011943

## 2018-10-05 PROCEDURE — 74011250637 HC RX REV CODE- 250/637: Performed by: OBSTETRICS & GYNECOLOGY

## 2018-10-05 PROCEDURE — A4300 CATH IMPL VASC ACCESS PORTAL: HCPCS | Performed by: ANESTHESIOLOGY

## 2018-10-05 PROCEDURE — 74011000250 HC RX REV CODE- 250

## 2018-10-05 PROCEDURE — 3E033VJ INTRODUCTION OF OTHER HORMONE INTO PERIPHERAL VEIN, PERCUTANEOUS APPROACH: ICD-10-PCS | Performed by: OBSTETRICS & GYNECOLOGY

## 2018-10-05 PROCEDURE — 77030002974 HC SUT PLN J&J -A: Performed by: OBSTETRICS & GYNECOLOGY

## 2018-10-05 PROCEDURE — 75410000003 HC RECOV DEL/VAG/CSECN EA 0.5 HR: Performed by: OBSTETRICS & GYNECOLOGY

## 2018-10-05 PROCEDURE — 76060000078 HC EPIDURAL ANESTHESIA

## 2018-10-05 PROCEDURE — 77010026065 HC OXYGEN MINIMUM MEDICAL AIR

## 2018-10-05 PROCEDURE — 76010000391 HC C SECN FIRST 1 HR: Performed by: OBSTETRICS & GYNECOLOGY

## 2018-10-05 PROCEDURE — 74011250636 HC RX REV CODE- 250/636: Performed by: OBSTETRICS & GYNECOLOGY

## 2018-10-05 PROCEDURE — 77030020255 HC SOL INJ LR 1000ML BG

## 2018-10-05 PROCEDURE — 75410000002 HC LABOR FEE PER 1 HR

## 2018-10-05 PROCEDURE — 77030002933 HC SUT MCRYL J&J -A: Performed by: OBSTETRICS & GYNECOLOGY

## 2018-10-05 PROCEDURE — 65270000029 HC RM PRIVATE

## 2018-10-05 PROCEDURE — 77030034696 HC CATH URETH FOL 2W BARD -A: Performed by: OBSTETRICS & GYNECOLOGY

## 2018-10-05 PROCEDURE — 77030031139 HC SUT VCRL2 J&J -A: Performed by: OBSTETRICS & GYNECOLOGY

## 2018-10-05 PROCEDURE — 77030018846 HC SOL IRR STRL H20 ICUM -A: Performed by: OBSTETRICS & GYNECOLOGY

## 2018-10-05 PROCEDURE — 0UB60ZZ EXCISION OF LEFT FALLOPIAN TUBE, OPEN APPROACH: ICD-10-PCS | Performed by: OBSTETRICS & GYNECOLOGY

## 2018-10-05 PROCEDURE — 77030014125 HC TY EPDRL BBMI -B: Performed by: ANESTHESIOLOGY

## 2018-10-05 PROCEDURE — 77030002966 HC SUT PDS J&J -A: Performed by: OBSTETRICS & GYNECOLOGY

## 2018-10-05 PROCEDURE — 77030032490 HC SLV COMPR SCD KNE COVD -B: Performed by: OBSTETRICS & GYNECOLOGY

## 2018-10-05 PROCEDURE — 74011250636 HC RX REV CODE- 250/636: Performed by: ANESTHESIOLOGY

## 2018-10-05 PROCEDURE — 77030018836 HC SOL IRR NACL ICUM -A: Performed by: OBSTETRICS & GYNECOLOGY

## 2018-10-05 PROCEDURE — 0HBAXZZ EXCISION OF INGUINAL SKIN, EXTERNAL APPROACH: ICD-10-PCS | Performed by: OBSTETRICS & GYNECOLOGY

## 2018-10-05 RX ORDER — MORPHINE SULFATE 10 MG/ML
5 INJECTION, SOLUTION INTRAMUSCULAR; INTRAVENOUS
Status: DISCONTINUED | OUTPATIENT
Start: 2018-10-05 | End: 2018-10-06

## 2018-10-05 RX ORDER — ONDANSETRON 2 MG/ML
INJECTION INTRAMUSCULAR; INTRAVENOUS AS NEEDED
Status: DISCONTINUED | OUTPATIENT
Start: 2018-10-05 | End: 2018-10-05 | Stop reason: HOSPADM

## 2018-10-05 RX ORDER — OXYCODONE HYDROCHLORIDE 5 MG/1
10 TABLET ORAL
Status: DISCONTINUED | OUTPATIENT
Start: 2018-10-05 | End: 2018-10-06 | Stop reason: SDUPTHER

## 2018-10-05 RX ORDER — ROPIVACAINE HYDROCHLORIDE 2 MG/ML
INJECTION, SOLUTION EPIDURAL; INFILTRATION; PERINEURAL
Status: DISCONTINUED | OUTPATIENT
Start: 2018-10-05 | End: 2018-10-05 | Stop reason: HOSPADM

## 2018-10-05 RX ORDER — CEFAZOLIN SODIUM/WATER 2 G/20 ML
2 SYRINGE (ML) INTRAVENOUS
Status: COMPLETED | OUTPATIENT
Start: 2018-10-05 | End: 2018-10-05

## 2018-10-05 RX ORDER — KETOROLAC TROMETHAMINE 30 MG/ML
30 INJECTION, SOLUTION INTRAMUSCULAR; INTRAVENOUS
Status: DISCONTINUED | OUTPATIENT
Start: 2018-10-05 | End: 2018-10-06

## 2018-10-05 RX ORDER — DIPHENHYDRAMINE HYDROCHLORIDE 50 MG/ML
INJECTION, SOLUTION INTRAMUSCULAR; INTRAVENOUS AS NEEDED
Status: DISCONTINUED | OUTPATIENT
Start: 2018-10-05 | End: 2018-10-05 | Stop reason: HOSPADM

## 2018-10-05 RX ORDER — CEFAZOLIN SODIUM IN 0.9 % NACL 2 G/100 ML
PLASTIC BAG, INJECTION (ML) INTRAVENOUS AS NEEDED
Status: DISCONTINUED | OUTPATIENT
Start: 2018-10-05 | End: 2018-10-05 | Stop reason: HOSPADM

## 2018-10-05 RX ORDER — LIDOCAINE HYDROCHLORIDE AND EPINEPHRINE 20; 5 MG/ML; UG/ML
INJECTION, SOLUTION EPIDURAL; INFILTRATION; INTRACAUDAL; PERINEURAL AS NEEDED
Status: DISCONTINUED | OUTPATIENT
Start: 2018-10-05 | End: 2018-10-05 | Stop reason: HOSPADM

## 2018-10-05 RX ORDER — KETOROLAC TROMETHAMINE 30 MG/ML
INJECTION, SOLUTION INTRAMUSCULAR; INTRAVENOUS AS NEEDED
Status: DISCONTINUED | OUTPATIENT
Start: 2018-10-05 | End: 2018-10-05 | Stop reason: HOSPADM

## 2018-10-05 RX ORDER — MORPHINE SULFATE 0.5 MG/ML
INJECTION, SOLUTION EPIDURAL; INTRATHECAL; INTRAVENOUS AS NEEDED
Status: DISCONTINUED | OUTPATIENT
Start: 2018-10-05 | End: 2018-10-05 | Stop reason: HOSPADM

## 2018-10-05 RX ORDER — FENTANYL CITRATE 50 UG/ML
INJECTION, SOLUTION INTRAMUSCULAR; INTRAVENOUS
Status: ACTIVE
Start: 2018-10-05 | End: 2018-10-06

## 2018-10-05 RX ORDER — NALOXONE HYDROCHLORIDE 0.4 MG/ML
0.2 INJECTION, SOLUTION INTRAMUSCULAR; INTRAVENOUS; SUBCUTANEOUS
Status: DISCONTINUED | OUTPATIENT
Start: 2018-10-05 | End: 2018-10-06

## 2018-10-05 RX ORDER — SODIUM CHLORIDE, SODIUM LACTATE, POTASSIUM CHLORIDE, CALCIUM CHLORIDE 600; 310; 30; 20 MG/100ML; MG/100ML; MG/100ML; MG/100ML
75 INJECTION, SOLUTION INTRAVENOUS CONTINUOUS
Status: DISCONTINUED | OUTPATIENT
Start: 2018-10-05 | End: 2018-10-06

## 2018-10-05 RX ORDER — ONDANSETRON 2 MG/ML
4 INJECTION INTRAMUSCULAR; INTRAVENOUS
Status: DISCONTINUED | OUTPATIENT
Start: 2018-10-05 | End: 2018-10-08 | Stop reason: ALTCHOICE

## 2018-10-05 RX ORDER — OXYTOCIN/RINGER'S LACTATE 30/500 ML
PLASTIC BAG, INJECTION (ML) INTRAVENOUS
Status: DISCONTINUED | OUTPATIENT
Start: 2018-10-05 | End: 2018-10-05 | Stop reason: HOSPADM

## 2018-10-05 RX ORDER — TRISODIUM CITRATE DIHYDRATE AND CITRIC ACID MONOHYDRATE 500; 334 MG/5ML; MG/5ML
30 SOLUTION ORAL
Status: COMPLETED | OUTPATIENT
Start: 2018-10-05 | End: 2018-10-05

## 2018-10-05 RX ORDER — DIPHENHYDRAMINE HYDROCHLORIDE 50 MG/ML
12.5 INJECTION, SOLUTION INTRAMUSCULAR; INTRAVENOUS
Status: DISCONTINUED | OUTPATIENT
Start: 2018-10-05 | End: 2018-10-06

## 2018-10-05 RX ORDER — SODIUM CHLORIDE, SODIUM LACTATE, POTASSIUM CHLORIDE, CALCIUM CHLORIDE 600; 310; 30; 20 MG/100ML; MG/100ML; MG/100ML; MG/100ML
INJECTION, SOLUTION INTRAVENOUS
Status: DISCONTINUED | OUTPATIENT
Start: 2018-10-05 | End: 2018-10-05 | Stop reason: HOSPADM

## 2018-10-05 RX ADMIN — DIPHENHYDRAMINE HYDROCHLORIDE 25 MG: 50 INJECTION, SOLUTION INTRAMUSCULAR; INTRAVENOUS at 20:26

## 2018-10-05 RX ADMIN — OXYTOCIN 2 MILLI-UNITS/MIN: 10 INJECTION, SOLUTION INTRAMUSCULAR; INTRAVENOUS at 06:05

## 2018-10-05 RX ADMIN — LIDOCAINE HYDROCHLORIDE AND EPINEPHRINE 5 ML: 20; 5 INJECTION, SOLUTION EPIDURAL; INFILTRATION; INTRACAUDAL; PERINEURAL at 19:55

## 2018-10-05 RX ADMIN — Medication 10 ML: at 04:41

## 2018-10-05 RX ADMIN — Medication 2 G: at 19:56

## 2018-10-05 RX ADMIN — SODIUM CHLORIDE, SODIUM LACTATE, POTASSIUM CHLORIDE, CALCIUM CHLORIDE: 600; 310; 30; 20 INJECTION, SOLUTION INTRAVENOUS at 19:55

## 2018-10-05 RX ADMIN — SODIUM CHLORIDE, SODIUM LACTATE, POTASSIUM CHLORIDE, CALCIUM CHLORIDE, AND DEXTROSE MONOHYDRATE 125 ML/HR: 600; 310; 30; 20; 5 INJECTION, SOLUTION INTRAVENOUS at 18:27

## 2018-10-05 RX ADMIN — AZITHROMYCIN MONOHYDRATE 0.5 G: 500 INJECTION, POWDER, LYOPHILIZED, FOR SOLUTION INTRAVENOUS at 19:56

## 2018-10-05 RX ADMIN — SODIUM CHLORIDE, SODIUM LACTATE, POTASSIUM CHLORIDE, CALCIUM CHLORIDE, AND DEXTROSE MONOHYDRATE 125 ML/HR: 600; 310; 30; 20; 5 INJECTION, SOLUTION INTRAVENOUS at 06:05

## 2018-10-05 RX ADMIN — BUTORPHANOL TARTRATE 1 MG: 1 INJECTION, SOLUTION INTRAMUSCULAR; INTRAVENOUS at 08:14

## 2018-10-05 RX ADMIN — Medication 2 G: at 19:50

## 2018-10-05 RX ADMIN — SODIUM CHLORIDE 12.5 MG: 9 INJECTION INTRAMUSCULAR; INTRAVENOUS; SUBCUTANEOUS at 04:36

## 2018-10-05 RX ADMIN — MORPHINE SULFATE 5 MG: 0.5 INJECTION, SOLUTION EPIDURAL; INTRATHECAL; INTRAVENOUS at 20:43

## 2018-10-05 RX ADMIN — SODIUM CHLORIDE, SODIUM LACTATE, POTASSIUM CHLORIDE, AND CALCIUM CHLORIDE 500 ML: 600; 310; 30; 20 INJECTION, SOLUTION INTRAVENOUS at 09:16

## 2018-10-05 RX ADMIN — KETOROLAC TROMETHAMINE 30 MG: 30 INJECTION, SOLUTION INTRAMUSCULAR; INTRAVENOUS at 21:04

## 2018-10-05 RX ADMIN — BUTORPHANOL TARTRATE 1 MG: 1 INJECTION, SOLUTION INTRAMUSCULAR; INTRAVENOUS at 04:36

## 2018-10-05 RX ADMIN — LIDOCAINE HYDROCHLORIDE AND EPINEPHRINE 5 ML: 20; 5 INJECTION, SOLUTION EPIDURAL; INFILTRATION; INTRACAUDAL; PERINEURAL at 19:50

## 2018-10-05 RX ADMIN — SODIUM CHLORIDE, SODIUM LACTATE, POTASSIUM CHLORIDE, CALCIUM CHLORIDE, AND DEXTROSE MONOHYDRATE 125 ML/HR: 600; 310; 30; 20; 5 INJECTION, SOLUTION INTRAVENOUS at 10:05

## 2018-10-05 RX ADMIN — ROPIVACAINE HYDROCHLORIDE 10 ML/HR: 2 INJECTION, SOLUTION EPIDURAL; INFILTRATION; PERINEURAL at 10:23

## 2018-10-05 RX ADMIN — Medication 300 ML/HR: at 20:26

## 2018-10-05 RX ADMIN — SODIUM CHLORIDE, SODIUM LACTATE, POTASSIUM CHLORIDE, CALCIUM CHLORIDE, AND DEXTROSE MONOHYDRATE 125 ML/HR: 600; 310; 30; 20; 5 INJECTION, SOLUTION INTRAVENOUS at 14:12

## 2018-10-05 RX ADMIN — ONDANSETRON HYDROCHLORIDE 4 MG: 2 INJECTION INTRAMUSCULAR; INTRAVENOUS at 16:11

## 2018-10-05 RX ADMIN — SODIUM CHLORIDE, SODIUM LACTATE, POTASSIUM CHLORIDE, AND CALCIUM CHLORIDE 75 ML/HR: 600; 310; 30; 20 INJECTION, SOLUTION INTRAVENOUS at 23:26

## 2018-10-05 RX ADMIN — LIDOCAINE HYDROCHLORIDE AND EPINEPHRINE 5 ML: 20; 5 INJECTION, SOLUTION EPIDURAL; INFILTRATION; INTRACAUDAL; PERINEURAL at 19:45

## 2018-10-05 RX ADMIN — SODIUM CITRATE AND CITRIC ACID MONOHYDRATE 30 ML: 500; 334 SOLUTION ORAL at 19:49

## 2018-10-05 RX ADMIN — AZITHROMYCIN MONOHYDRATE 500 MG: 500 INJECTION, POWDER, LYOPHILIZED, FOR SOLUTION INTRAVENOUS at 19:51

## 2018-10-05 RX ADMIN — LIDOCAINE HYDROCHLORIDE AND EPINEPHRINE 5 ML: 20; 5 INJECTION, SOLUTION EPIDURAL; INFILTRATION; INTRACAUDAL; PERINEURAL at 19:59

## 2018-10-05 RX ADMIN — FENTANYL CITRATE 100 MCG: 50 INJECTION, SOLUTION INTRAMUSCULAR; INTRAVENOUS at 17:34

## 2018-10-05 RX ADMIN — ONDANSETRON 4 MG: 2 INJECTION INTRAMUSCULAR; INTRAVENOUS at 20:26

## 2018-10-05 NOTE — PROGRESS NOTES
darien from Emilio Kellogg, [facility]. Pt assumed for care. Pt dozing,  asleep on the couch.

## 2018-10-05 NOTE — PROGRESS NOTES
sbar to Khadar Manzo, [facility]. Pt assumed for care. Pt would like to be checked. Loose 1cm and not as posterior as before. Pt would like an epidural. Dr Maryana Pendleton notified. May have an epidural. LR bolus started.

## 2018-10-05 NOTE — PROGRESS NOTES
[facility] Braden, CRNA in to assess pt's pain. Plan of care discussed to decrease pain in right hip and then resume the peanut.

## 2018-10-05 NOTE — PROGRESS NOTES
Admission assessment complete. Patient with no c/o HA, N/V, epigastric pain, blurred vision, +2 DTR's to bilateral lower extremities. IV placed and blood sent to lab. Negative for LOF, vaginal bleeding/discharge. Patient oriented room. Will continue to monitor.

## 2018-10-05 NOTE — PROGRESS NOTES
10/04/18 2317 Cervical Exam  
Dilation (cm) 1 Eff 30 % [address] Position Posterior Cervical Consistency Soft Vaginal exam done by? Roni Guzman, JACOB Will administer cytotec 50 mcg.

## 2018-10-05 NOTE — H&P
History & Physical 
 
Name: Marlo Cheema MRN: [id number]  SSN: [id number] YOB: 1980  Age: 41 y.o. Sex: female Subjective:  
 
Estimated Date of Delivery: 10/10/18 OB History  Para Term  AB Living 2    1 SAB TAB Ectopic Molar Multiple Live Births # Outcome Date GA Lbr Bro/2nd Weight Sex Delivery Anes PTL Lv  
2 Current 1 AB Ms. Norberto Cano is admitted with pregnancy at 39w2d for induction of labor due to [facility] and maternal pain. Prenatal course was complicated by advanced maternal age and pain and depression. Please see prenatal records for details. No past medical history on file. No past surgical history on file. Social History Occupational History  Not on file. Social History Main Topics  Smoking status: Never Smoker  Smokeless tobacco: Never Used  Alcohol use No  
 Drug use: No  
 Sexual activity: Yes No family history on file. No Known Allergies Prior to Admission medications Medication Sig Start Date End Date Taking? Authorizing Provider  
loratadine (CLARITIN) 10 mg tablet Take 10 mg by mouth. Yes Historical Provider  
calcium carbonate (TUMS) 200 mg calcium (500 mg) chew Take 1 Tab by mouth daily. Yes Historical Provider  
acetaminophen (TYLENOL EXTRA STRENGTH) 500 mg tablet Take 1,000 mg by mouth every six (6) hours as needed for Pain. Yes Historical Provider Review of Systems: A comprehensive review of systems was negative except for that written in the History of Present Illness. Objective:  
 
Vitals: 
Vitals:  
 10/05/18 2146 10/05/18 7433 10/05/18 1999 10/05/18 1864 BP: 135/86 134/81 130/77 140/86 Pulse: 70 77 75 71 Temp:      
  
 
Physical Exam: 
Patient without distress. Heart: Regular rate and rhythm Lung: clear to auscultation throughout lung fields, no wheezes, no rales, no rhonchi and normal respiratory effort Abdomen: soft, nontender Cervical Exam: 1 cm dilated 60% effaced   
-3 station Membranes:  Artificial Rupture of Membranes; Amniotic Fluid: small amount of clear fluid Fetal Heart Rate: Reactive Prenatal Labs:  
Lab Results Component Value Date/Time ABO/Rh(D) O POSITIVE 10/04/2018 07:26 PM  
 Rubella, External Immune 02/22/2018 HBsAg, External negative 02/22/2018 HIV, External Non-Reactive 02/22/2018 RPR, External Non-Reactive 02/22/2018 GrBStrep, External Negative 09/18/2018 Impression/Plan: Active Problems: 
  39 weeks gestation of pregnancy (10/4/2018) Encounter for planned induction of labor (10/4/2018) Plan: Admit for induction of labor. Group B Strep negative. Signed By:  Db Warner MD   
 October 5, 2018

## 2018-10-05 NOTE — PROGRESS NOTES
Dr Annita Benitez called to the room to evaluate prolonged deceleration into the 60's for 8 minutes with return to a baseline of 170's with late decelerations. SVE 8cm, pitocin off, IVB begun and pt turned to right side. Dr Annita Benitez informed pt because of her vomiting episode could have decreased blood flow to baby. Pt encouraged to return to the peanut but pt refused.

## 2018-10-05 NOTE — PROGRESS NOTES
Labor Progress Note Patient seen, fetal heart rate and contraction pattern evaluated, patient examined. No data found. Physical Exam: 
Cervical Exam:  8 cm dilated 100% effaced   
-1 station Membranes:  Artificial Rupture of Membranes; Amniotic Fluid: medium amount of clear fluid Uterine Activity: Intensity: strong Fetal Heart Rate: Reactive Decelerations: variable Assessment/Plan: 
Reassuring fetal status, Continue plan for vaginal delivery, had N/V, then following a prolong decel, now back to good variability, feel vasovagal, given Zofran, IV bolus, wanted to do Peanut but says tried before and too uncomforable.

## 2018-10-05 NOTE — PROGRESS NOTES
Labor Progress Note Patient seen, fetal heart rate and contraction pattern evaluated, patient examined. Patient Vitals for the past 1 hrs: 
 BP Temp Pulse 10/05/18 1925 - 99.1 °F (37.3 °C) -  
10/05/18 1910 150/77 - 100 Physical Exam: 
Cervical Exam:  8 cm dilated  No change for 2+ hours, cervix is swollen, has not dropped, been on pitocin and the peanut,  
100% effaced   
-1 station Membranes:  Artificial Rupture of Membranes; Amniotic Fluid: medium amount of clear fluid Uterine Activity: Intensity: strong Fetal Heart Rate: Reactive Assessment/Plan: 
Reassuring fetal status, offered giving more time, patient tired, ready to Proceed with  Section Reassuring fetal status with labor not progressing normally, findings consistent with failure of dilatation. Recommended proceeding with  delivery. Risks of bleeding, infection, bladder and bowel damage explained to patient and . They understand the situation and consent to the  delivery. Waiting longer will only increase the risk.

## 2018-10-05 NOTE — PROGRESS NOTES
Labor Progress Note Patient seen, fetal heart rate and contraction pattern evaluated, patient examined. No data found. Physical Exam: 
Cervical Exam:  4 cm dilated 100% effaced   
-1 station Membranes:  Artificial Rupture of Membranes; Amniotic Fluid: small amount of clear fluid Uterine Activity: Intensity: strong Fetal Heart Rate: Reactive Assessment/Plan: 
Reassuring fetal status, Continue plan for vaginal delivery

## 2018-10-05 NOTE — PROGRESS NOTES
Texted Dr Monica Chamberlain regarding temp 100.0, SVE, pt is now on the peanut,  ctx 2-4 min, nonreactive NST. No new orders at this time.

## 2018-10-05 NOTE — ANESTHESIA PREPROCEDURE EVALUATION
Anesthetic History Review of Systems / Medical History Patient summary reviewed Pulmonary Neuro/Psych Cardiovascular Exercise tolerance: >4 METS 
  
GI/Hepatic/Renal 
  
 
 
 
 
 
 Endo/Other Other Findings Physical Exam 
 
Airway Mallampati: II 
TM Distance: > 6 cm Neck ROM: normal range of motion Mouth opening: Normal 
 
 Cardiovascular Regular rate and rhythm,  S1 and S2 normal,  no murmur, click, rub, or gallop Dental 
No notable dental hx Pulmonary Breath sounds clear to auscultation Abdominal 
 
 
 
 Other Findings Anesthetic Plan ASA: 2 Anesthesia type: epidural 
 
 
Post-op pain plan if not by surgeon: indwelling epidural catheter Anesthetic plan and risks discussed with: Patient

## 2018-10-05 NOTE — PROGRESS NOTES
FHT's baseline 150 with late decelerations into the 90's. Pt turned to right tilt, oxygen applied, pt then turned to right side, IVB.

## 2018-10-05 NOTE — PROGRESS NOTES
Irregular contractions noted on EFM. Patient states she is feeling intermittent cramping pain 2/1. Declines interventions at this point.

## 2018-10-05 NOTE — ANESTHESIA PROCEDURE NOTES
Epidural Block Start time: 10/5/2018 10:07 AM 
End time: 10/5/2018 10:14 AM 
Performed by: Brandan Perry Authorized by: Brandan Perry Pre-Procedure Indication: labor epidural   
Preanesthetic Checklist: patient identified, risks and benefits discussed, anesthesia consent, site marked, patient being monitored, timeout performed and anesthesia consent Timeout Time: 10:05 Epidural:  
Patient position:  Seated Prep region:  Lumbar Prep: DuraPrep Location:  L3-4 Needle and Epidural Catheter:  
Needle Type:  Tuohy Needle Gauge:  18 G Injection Technique:  Loss of resistance using air Attempts:  1 Catheter Size:  20 G Depth in Epidural Space (cm):  4 Events: no blood with aspiration, no cerebrospinal fluid with aspiration, no paresthesia and negative aspiration test   
Test Dose:  Lidocaine 1.5% w/ epi and negative Assessment:  
Catheter Secured:  Tegaderm and tape Insertion:  Uncomplicated Patient tolerance:  Patient tolerated the procedure well with no immediate complications

## 2018-10-06 LAB
HCT VFR BLD AUTO: 31.9 % (ref 35.8–46.3)
HGB BLD-MCNC: 9.9 G/DL (ref 11.7–15.4)

## 2018-10-06 PROCEDURE — 74011250637 HC RX REV CODE- 250/637: Performed by: OBSTETRICS & GYNECOLOGY

## 2018-10-06 PROCEDURE — 74011250636 HC RX REV CODE- 250/636: Performed by: OBSTETRICS & GYNECOLOGY

## 2018-10-06 PROCEDURE — 74011250636 HC RX REV CODE- 250/636: Performed by: ANESTHESIOLOGY

## 2018-10-06 PROCEDURE — 36415 COLL VENOUS BLD VENIPUNCTURE: CPT

## 2018-10-06 PROCEDURE — 74011000258 HC RX REV CODE- 258: Performed by: OBSTETRICS & GYNECOLOGY

## 2018-10-06 PROCEDURE — 65270000029 HC RM PRIVATE

## 2018-10-06 PROCEDURE — 74011250637 HC RX REV CODE- 250/637: Performed by: ANESTHESIOLOGY

## 2018-10-06 PROCEDURE — 85014 HEMATOCRIT: CPT

## 2018-10-06 RX ORDER — LOPERAMIDE HYDROCHLORIDE 2 MG/1
4 CAPSULE ORAL
Status: DISCONTINUED | OUTPATIENT
Start: 2018-10-06 | End: 2018-10-08 | Stop reason: HOSPADM

## 2018-10-06 RX ORDER — OXYCODONE AND ACETAMINOPHEN 7.5; 325 MG/1; MG/1
1 TABLET ORAL
Status: DISCONTINUED | OUTPATIENT
Start: 2018-10-06 | End: 2018-10-08 | Stop reason: HOSPADM

## 2018-10-06 RX ORDER — ACETAMINOPHEN 10 MG/ML
1000 INJECTION, SOLUTION INTRAVENOUS ONCE
Status: COMPLETED | OUTPATIENT
Start: 2018-10-06 | End: 2018-10-06

## 2018-10-06 RX ORDER — OXYCODONE HYDROCHLORIDE 5 MG/1
10 TABLET ORAL
Status: DISCONTINUED | OUTPATIENT
Start: 2018-10-06 | End: 2018-10-08 | Stop reason: HOSPADM

## 2018-10-06 RX ORDER — SIMETHICONE 80 MG
80 TABLET,CHEWABLE ORAL
Status: DISCONTINUED | OUTPATIENT
Start: 2018-10-07 | End: 2018-10-08 | Stop reason: HOSPADM

## 2018-10-06 RX ORDER — KETOROLAC TROMETHAMINE 10 MG/1
10 TABLET, FILM COATED ORAL EVERY 6 HOURS
Status: DISCONTINUED | OUTPATIENT
Start: 2018-10-07 | End: 2018-10-08 | Stop reason: HOSPADM

## 2018-10-06 RX ORDER — SODIUM CHLORIDE 0.9 % (FLUSH) 0.9 %
5-10 SYRINGE (ML) INJECTION EVERY 8 HOURS
Status: DISCONTINUED | OUTPATIENT
Start: 2018-10-07 | End: 2018-10-07 | Stop reason: ALTCHOICE

## 2018-10-06 RX ORDER — DOCUSATE SODIUM 100 MG/1
100 CAPSULE, LIQUID FILLED ORAL 2 TIMES DAILY
Status: DISCONTINUED | OUTPATIENT
Start: 2018-10-07 | End: 2018-10-08 | Stop reason: HOSPADM

## 2018-10-06 RX ORDER — KETOROLAC TROMETHAMINE 30 MG/ML
30 INJECTION, SOLUTION INTRAMUSCULAR; INTRAVENOUS EVERY 6 HOURS
Status: DISCONTINUED | OUTPATIENT
Start: 2018-10-07 | End: 2018-10-07

## 2018-10-06 RX ORDER — DIPHENHYDRAMINE HCL 25 MG
25 CAPSULE ORAL
Status: DISCONTINUED | OUTPATIENT
Start: 2018-10-06 | End: 2018-10-08 | Stop reason: HOSPADM

## 2018-10-06 RX ORDER — GUAIFENESIN/DEXTROMETHORPHAN 100-10MG/5
10 SYRUP ORAL
Status: DISCONTINUED | OUTPATIENT
Start: 2018-10-06 | End: 2018-10-08 | Stop reason: HOSPADM

## 2018-10-06 RX ORDER — SODIUM CHLORIDE 0.9 % (FLUSH) 0.9 %
5-10 SYRINGE (ML) INJECTION AS NEEDED
Status: DISCONTINUED | OUTPATIENT
Start: 2018-10-06 | End: 2018-10-07 | Stop reason: ALTCHOICE

## 2018-10-06 RX ORDER — OXYCODONE HYDROCHLORIDE 5 MG/1
5 TABLET ORAL
Status: DISCONTINUED | OUTPATIENT
Start: 2018-10-06 | End: 2018-10-08 | Stop reason: HOSPADM

## 2018-10-06 RX ORDER — OXYCODONE AND ACETAMINOPHEN 7.5; 325 MG/1; MG/1
2 TABLET ORAL
Status: DISCONTINUED | OUTPATIENT
Start: 2018-10-06 | End: 2018-10-08 | Stop reason: HOSPADM

## 2018-10-06 RX ADMIN — KETOROLAC TROMETHAMINE 30 MG: 30 INJECTION, SOLUTION INTRAMUSCULAR at 04:34

## 2018-10-06 RX ADMIN — KETOROLAC TROMETHAMINE 30 MG: 30 INJECTION, SOLUTION INTRAMUSCULAR at 21:08

## 2018-10-06 RX ADMIN — OXYCODONE HYDROCHLORIDE 10 MG: 5 TABLET ORAL at 10:00

## 2018-10-06 RX ADMIN — GUAIFENESIN AND DEXTROMETHORPHAN 10 ML: 100; 10 SYRUP ORAL at 20:13

## 2018-10-06 RX ADMIN — CEFAZOLIN 1 G: 1 INJECTION, POWDER, FOR SOLUTION INTRAMUSCULAR; INTRAVENOUS at 08:55

## 2018-10-06 RX ADMIN — KETOROLAC TROMETHAMINE 30 MG: 30 INJECTION, SOLUTION INTRAMUSCULAR at 15:01

## 2018-10-06 RX ADMIN — CEFAZOLIN 1 G: 1 INJECTION, POWDER, FOR SOLUTION INTRAMUSCULAR; INTRAVENOUS at 21:07

## 2018-10-06 RX ADMIN — Medication 10 ML: at 21:09

## 2018-10-06 RX ADMIN — OXYCODONE HYDROCHLORIDE 10 MG: 5 TABLET ORAL at 01:45

## 2018-10-06 RX ADMIN — CEFAZOLIN 1 G: 1 INJECTION, POWDER, FOR SOLUTION INTRAMUSCULAR; INTRAVENOUS at 15:09

## 2018-10-06 RX ADMIN — Medication 10 ML: at 15:00

## 2018-10-06 RX ADMIN — ACETAMINOPHEN 1000 MG: 10 INJECTION, SOLUTION INTRAVENOUS at 10:00

## 2018-10-06 NOTE — PROGRESS NOTES
SBAR IN Report: Mother Verbal report received from Paulina Temple RN on this patient, who is now being transferred from L&D (unit) for routine progression of care. The patient is wearing a green \"Anesthesia-Duramorph\" band. Report consisted of patient's Situation, Background, Assessment and Recommendations (SBAR).  ID bands were compared with the identification form, and verified with the patient and transferring nurse. Information from the SBAR and the [place] Report was reviewed with the transferring nurse; opportunity for questions and clarification provided.

## 2018-10-06 NOTE — PROGRESS NOTES
Anesthesiology  Post-op Note Post-op day 1 s/p  via epidural with neuraxial opioids for post-op pain management. Visit Vitals  /63 (BP 1 Location: Left arm)  Pulse 97  Temp 36.8 °C (98.3 °F)  Resp 16  SpO2 95%  Breastfeeding Yes Airway patent, patient appropriately hydrated and appears euvolemic. Patient is Alert and oriented. Pain is moderately controlled. Pruritus is well controlled. Nausea is well controlled. No complaints about back or site of injection. Motor and sensory function has returned to baseline in lower extremities. Patient is satisfied with anesthetic and reports no complications. Continue current orders, then initiate surgeon's orders for pain management 24 hours after . Follow up per surgeon.

## 2018-10-06 NOTE — OP NOTES
[facility] REPORT    Catrachita Angel  MR#: [id number]  : 1980  ACCOUNT #: [de-identified]   DATE OF SERVICE: 10/05/2018    PREOPERATIVE DIAGNOSES:    1.  39-2/7 weeks' intrauterine pregnancy with secondary arrest of dilatation and descent. 2.  2.  Right lower inguinal nevi. POSTOPERATIVE DIAGNOSES:    1.  39-2/7 weeks' intrauterine pregnancy with secondary arrest of dilatation and descent with operative delivery at 8:23 p.m. on 10/05/2018 with the delivery of a female infant weighing 7 pounds 3 ounces (3.265 kg) with Apgars 9 and 9, named Shani Seat. 2.  Right lower inguinal nevi. PROCEDURES PERFORMED:    1. Primary low transverse . 2.  Excision of a 1 x 0.5 cm nevi. SURGEON:  Ese Amaya MD    ANESTHESIA:  Continuous lumbar epidural.    ESTIMATED BLOOD LOSS:  800 mL. DRAINS:  Stephenson. SPECIMENS REMOVED:  None. COMPLICATIONS:  None. IMPLANTS:      ASSISTANT:      FINDINGS:  A fairly normally uterus with some decidual reactions around the ovaries and the uterus consistent with possible from endometriosis, hydatid cyst on the left tube removed, a lower down close to the incision mole on her lower side that was also removed. DESCRIPTION OF PROCEDURE:  After informed consent was obtained, the patient was taken to OR with good working level obtained on her epidural.  A timeout performed. She was then prepped and draped in the usual sterile fashion. A transverse Pfannenstiel skin incision made, carried to the fascia, nicked in midline and extended out bilaterally and dissected superiorly and inferiorly off the rectus muscles. All bleeding controlled with electrocautery. The peritoneum was then entered in the midline and extended superiorly and down inferiorly. Bladder flap created. Hysterotomy incision made. Infant was delivered on the abdomen and bulb suctioned. The nuchal cord was easily reduced.   Rest of the infant delivered on the abdomen. Delayed cord clamping and then it was clamped and cut and it was then passed off to the awaiting attendants who awarded the above Apgars. The placenta was extracted. Uterus exteriorized and wrapped in wet lap pad and dry curetted. Uterine incision was closed with 2 layers, the first running interlocking, the second a running imbricating with good hemostasis obtained. The cul-de-sac was irrigated. The hydatid cyst on that left tube was removed. The decidual reaction was noted in the posterior aspect of the uterus and the tubes and ovaries. Otherwise cul-de-sac was fairly clear of any other major abnormalities. The uterus returned to its original place on abdominal cavity. Both paracolic gutters irrigated, clots removed. The bladder flap closed with 3-0 Monocryl, anterior peritoneum was closed with 2-0 Monocryl, rectus muscles irrigated, bleeding controlled with cautery. Reapproximate the fascia with a 0 PDS in running fashion x2. Subcutaneous tissue was irrigated. Bleeders controlled with electrocautery and approximated with 2-0 plain and skin was closed with subcuticular 4-0 Monocryl. The incision also was made excising and removing that lesion over on the inguinal right side with bleeding controlled with electrocautery and then some of the 4-0 Monocryl was used to close. All sponge counts were correct x2. Patient to return back to the room with infant, both in good condition.       Jose G Lucas MD MLS / MN  D: 10/05/2018 21:30     T: 10/06/2018 09:14  JOB #: [id number]  CC: Tosha Chavis MD  [address]

## 2018-10-06 NOTE — PROGRESS NOTES
Dr Rosibel Schofield notified via phone of I&O since 0600 and pitting edema- intake 2849 ml and output 480 ml. MD states to JOHN linares at this time. Read back.

## 2018-10-06 NOTE — PROGRESS NOTES
10/06/18 1000 Pain Assessment Pain Scale 1 Numeric (0 - 10) Pain Intensity 1 2 Pain Location 1 Abdomen; Incisional  
Pain Orientation 1 Anterior Pain Description 1 Aching Pain Intervention(s) 1 Medication (see MAR) Vital Signs Level of Consciousness Alert Oxycodone 10 mg and given to pt per request and Ofirmev started per order. Educated pt to call out if pain medication does not help.

## 2018-10-06 NOTE — PROGRESS NOTES
Admission assessment complete as noted. Patient oriented to room and unit. Plan of care reviewed and patient verbalizes understanding. Questions encouraged and answered. Patent encouraged to call for needs or concerns.

## 2018-10-06 NOTE — PROGRESS NOTES
Bedside report received from [facility] Ramu Castillo [facility]

## 2018-10-06 NOTE — LACTATION NOTE

## 2018-10-06 NOTE — PROGRESS NOTES
Dr. Kit Carrington at bedside. E 8-9/100/-1. MD spoke with patient concerning failure to progress and possibility of a C/S. Patient stated she understood and was ready for C/S instead of continuing to labor. C/S called at 1929.

## 2018-10-06 NOTE — PROGRESS NOTES
Discussed and provided [facility]  Home Visit brochure. Referral will be made. Verified demographic on face sheet. Also provided Provided Postpartum Depression packet.

## 2018-10-06 NOTE — PROGRESS NOTES
IV converted to saline lock. SCD's removed. Stephenson remains in place. Dressing removed. Assisted pt to shower. Chlorhexidine taught and encouraged use. Kina care taught. Gown and bed linen changed. Assisted pt back to bed. Pt tolerated well.

## 2018-10-06 NOTE — ANESTHESIA POSTPROCEDURE EVALUATION
Post-Anesthesia Evaluation and Assessment Patient: Yen Robles MRN: [id number]  SSN: [id number] YOB: 1980  Age: 41 y.o. Sex: female Cardiovascular Function/Vital Signs Visit Vitals  /66 (BP 1 Location: Left arm, BP Patient Position: At rest)  Pulse 88  Temp 36.9 °C (98.5 °F)  Resp 16  SpO2 98%  Breastfeeding Yes Patient is status post epidural anesthesia for Procedure(s):  SECTION. Nausea/Vomiting: None Postoperative hydration reviewed and adequate. Pain: 
Pain Scale 1: Numeric (0 - 10) (10/05/18 2217) Pain Intensity 1: 6 (10/05/18 2217) Managed Neurological Status:  
Neuro (WDL): Within Defined Limits (10/05/18 2135) At baseline Mental Status and Level of Consciousness: Arousable Pulmonary Status:  
O2 Device: Room air (10/06/18 0010) Adequate oxygenation and airway patent Complications related to anesthesia: None Post-anesthesia assessment completed. No concerns Signed By: Alma Brooks MD   
 2018

## 2018-10-06 NOTE — LACTATION NOTE
This note was copied from a baby's chart. Assisted with breastfeeding in football on L. Baby fed well, latched easily. Demonstrated manual lip flange. Encouraged frequent feeding and watch output. May want to practice in cross cradle at next feeding. Encouraged mom to offer both sides at each feeding for now.

## 2018-10-06 NOTE — PROGRESS NOTES
Post-Operative Day Number 1 Progress Note Patient doing well post-op day 1 from  delivery without significant complaints. Pain controlled on current medication. Voiding without difficulty, normal lochia. Vitals:  Patient Vitals for the past 8 hrs: 
 BP Temp Pulse Resp SpO2  
10/06/18 0800 131/53 98.1 °F (36.7 °C) (!) 104 18 93 % 10/06/18 0453 116/63 98.3 °F (36.8 °C) 97 16 95 % Temp (24hrs), Av.5 °F (36.9 °C), Min:97.5 °F (36.4 °C), Max:100 °F (37.8 °C) Vital signs stable, afebrile. Exam:  Patient without distress. Abdomen soft, fundus firm at level of umbilicus, non tender. Incision dry and clean without erythema. Lower extremities are negative for swelling, cords or tenderness. Lab/Data Review: CBC:  
Lab Results Component Value Date/Time HGB 9.9 (L) 10/06/2018 06:59 AM  
 HCT 31.9 (L) 10/06/2018 06:59 AM  
 
 
Assessment and Plan:  Patient appears to be having uncomplicated post- course. Continue routine post-op care and maternal education.

## 2018-10-06 NOTE — L&D DELIVERY NOTE
Delivery Note    Obstetrician:  Bessie Persaud MD    Assistant: none    Pre-Delivery Diagnosis: Term pregnancy, Induced labor, Single fetus, Pregnancy complicated by: AMA, pain and 2nd arrest of dilatation and decent    Post-Delivery Diagnosis: Living  infant(s), Female and named Vonita Ards    Intrapartum Event: Cephalopelvic disproportion    Procedure: Primary Low Transverse  section    Delivery Summary    Patient: Ji Loyd MRN: [id number]  SSN: [id number]    YOB: 1980  Age: 41 y.o. Sex: female        Information for the patient's :  Cami Holder [[id number]]       Labor Events:    Labor: No   Rupture Date: 10/5/2018   Rupture Time: 7:20 AM   Rupture Type AROM   Amniotic Fluid Volume: Moderate    Amniotic Fluid Description: Clear None   Induction: Oxytocin       Augmentation: AROM   Labor Events: Failure to Progress in First Stage;Cephalopelvic Disproportion     Cervical Ripening: 10/5/2018 9:16 PM Misoprostol     Delivery Events:  Episiotomy: None   Laceration(s): None     Repaired:      Number of Repair Packets:     Suture Type and Size:       Estimated Blood Loss (ml): 800.00ml       Delivery Date: 10/5/2018    Delivery Time: 8:23 PM  Delivery Type: , Low Transverse   Details    Trial of Labor: Yes   Primary/Repeat: Primary   Priority: Routine   Indications:  Arrest of Descent; Failure to Progress   Incision type:     Sex:  Female     Gestational Age: 44w2d  Delivery Clinician:  Bessie Persaud  Living Status: Living   Delivery Location: OR            APGARS  One minute Five minutes Ten minutes   Skin color: 1   1        Heart rate: 2   2        Grimace: 2   2        Muscle tone: 2   2        Breathin   2        Totals: 9   9          Presentation: Vertex    Position: Right Occiput Posterior  Resuscitation Method:  Suctioning-bulb; Tactile Stimulation     Meconium Stained: None      Cord Vessels: 3 Vessels      Cord Events:    Cord Blood Sent?:  Yes    Blood Gases Sent?:  Yes    Placenta:  Date/Time: 10/5  8:26 PM  Removal: Manual Removal      Appearance: Normal;Intact     [place] Measurements:  Birth Weight: 3.265 kg      Birth Length: 50.5 cm      Head Circumference: 35 cm      Chest Circumference: 34 cm     Abdominal Girth: Other Providers:   Jenny SINGH;LAURIE TORRES;SUSANA SMITH;LUDIVINA BOTELLO;Natalie SANTOS KATE;JOSSE MEJIA;NOLBERTO FRIAS, Obstetrician;Primary Nurse;Neonatologist;Anesthesiologist;Crna;Staff Nurse;Scrub Tech;Respiratory Therapist             Group B Strep:   Lab Results   Component Value Date/Time    GrBStrep, External Negative 2018     Information for the patient's :  Kaylie Azul [[id number]]     Lab Results   Component Value Date/Time    ABO/Rh(D) B POSITIVE 10/05/2018 08:23 PM    YVES IgG NEG 10/05/2018 08:23 PM     Lab Results   Component Value Date/Time    APH 7.286 10/05/2018 08:23 PM    APCO2 44 10/05/2018 08:23 PM    APO2 29 (H) 10/05/2018 08:23 PM    AHCO3 21 (L) 10/05/2018 08:23 PM    ABDC 5.9 (H) 10/05/2018 08:23 PM    EPHV 7.336 10/05/2018 08:23 PM    PCO2V 39 10/05/2018 08:23 PM    PO2V 26 (L) 10/05/2018 08:23 PM    HCO3V 20 10/05/2018 08:23 PM    EBDV 5.0 10/05/2018 08:23 PM    SITE CORD 10/05/2018 08:23 PM    SITE CORD 10/05/2018 08:23 PM    RSCOM NA at 10 5 2018 8 37 28 PM. Not read back. 10/05/2018 08:23 PM    RSCOM NA at 10 5 2018 8 39 47 PM. Not read back.  10/05/2018 08:23 PM        Specimens: none           Complications:  none           Cord Blood Results:   Information for the patient's :  [place] An [[id number]]     Lab Results   Component Value Date/Time    YVES IgG NEG 10/05/2018 08:23 PM    ABO/Rh(D) B POSITIVE 10/05/2018 08:23 PM     Prenatal Labs:     Lab Results   Component Value Date/Time    ABO/Rh(D) O POSITIVE 10/04/2018 07:26 PM    HBsAg, External negative 2018    HIV, External Non-Reactive 2018 Rubella, External Immune 02/22/2018    RPR, External Non-Reactive 02/22/2018    GrBStrep, External Negative 09/18/2018      See dictation #[id number]    Attending Attestation: I was present and scrubbed for the entire procedure    Signed By:  Radha Mendieta MD     October 5, 2018

## 2018-10-06 NOTE — PROGRESS NOTES
SBAR OUT Report: Mother Verbal report given to Amirah Acosta RN (full name & credentials) on this patient, who is now being transferred to MIU (unit) for routine progression of care. The patient is wearing a green \"Anesthesia-Duramorph\" band. Report consisted of patient's Situation, Background, Assessment and Recommendations (SBAR). [place] ID bands were compared with the identification form, and verified with the patient and receiving nurse. Information from the SBAR and the [address] [facility] Report was reviewed with the receiving nurse; opportunity for questions and clarification provided.

## 2018-10-07 PROCEDURE — 74011250637 HC RX REV CODE- 250/637: Performed by: OBSTETRICS & GYNECOLOGY

## 2018-10-07 PROCEDURE — 65270000029 HC RM PRIVATE

## 2018-10-07 RX ADMIN — OXYCODONE HYDROCHLORIDE AND ACETAMINOPHEN 1 TABLET: 7.5; 325 TABLET ORAL at 01:54

## 2018-10-07 RX ADMIN — DOCUSATE SODIUM 100 MG: 100 CAPSULE, LIQUID FILLED ORAL at 18:29

## 2018-10-07 RX ADMIN — OXYCODONE HYDROCHLORIDE AND ACETAMINOPHEN 2 TABLET: 7.5; 325 TABLET ORAL at 18:29

## 2018-10-07 RX ADMIN — KETOROLAC TROMETHAMINE 10 MG: 10 TABLET, FILM COATED ORAL at 22:21

## 2018-10-07 RX ADMIN — DOCUSATE SODIUM 100 MG: 100 CAPSULE, LIQUID FILLED ORAL at 09:44

## 2018-10-07 RX ADMIN — KETOROLAC TROMETHAMINE 10 MG: 10 TABLET, FILM COATED ORAL at 15:51

## 2018-10-07 RX ADMIN — KETOROLAC TROMETHAMINE 10 MG: 10 TABLET, FILM COATED ORAL at 09:44

## 2018-10-07 RX ADMIN — SIMETHICONE CHEW TAB 80 MG 80 MG: 80 TABLET ORAL at 18:29

## 2018-10-07 RX ADMIN — OXYCODONE HYDROCHLORIDE AND ACETAMINOPHEN 1 TABLET: 7.5; 325 TABLET ORAL at 09:45

## 2018-10-07 RX ADMIN — KETOROLAC TROMETHAMINE 10 MG: 10 TABLET, FILM COATED ORAL at 03:25

## 2018-10-07 RX ADMIN — OXYCODONE HYDROCHLORIDE AND ACETAMINOPHEN 2 TABLET: 7.5; 325 TABLET ORAL at 23:45

## 2018-10-07 RX ADMIN — SIMETHICONE CHEW TAB 80 MG 80 MG: 80 TABLET ORAL at 22:21

## 2018-10-07 RX ADMIN — SIMETHICONE CHEW TAB 80 MG 80 MG: 80 TABLET ORAL at 09:44

## 2018-10-07 RX ADMIN — OXYCODONE HYDROCHLORIDE AND ACETAMINOPHEN 2 TABLET: 7.5; 325 TABLET ORAL at 13:41

## 2018-10-07 NOTE — LACTATION NOTE
In to follow up with mom and infant. Mom stated that infant continues to latch and nurse well. Reviewed the second night of life with mom and dad but mom stated that she feels that infant cluster fed last night. Informed mom that she may have and could possibly not cluster feed tonight. Mom wanted her personal breast pump demonstrated and I did so. Answered questions in regards to pumping and storage. Lactation consultant will follow up tomorrow.

## 2018-10-07 NOTE — PROGRESS NOTES
10/07/18 2732 Pain Assessment Pain Scale 1 Numeric (0 - 10) Pain Intensity 1 6 Pain Location 1 Abdomen; Incisional  
Pain Orientation 1 Anterior Pain Description 1 Aching Pain Intervention(s) 1 Medication (see MAR) Vital Signs Level of Consciousness Alert POSS Scale Opioid Sedation Scale 1 Toradol 10mg PO and Norco x 1 given to pt per request.  Educated pt to call out if pain medication does not help.

## 2018-10-07 NOTE — PROGRESS NOTES
Post-Operative Day Number 2 Progress Note Patient doing well post-op day 2 from  delivery without significant complaints. Pain controlled on current medication. Voiding without difficulty, normal lochia. Vitals:  Patient Vitals for the past 8 hrs: 
 BP Temp Pulse Resp SpO2  
10/07/18 0750 113/66 98.5 °F (36.9 °C) 95 18 97 % Temp (24hrs), Av.5 °F (36.9 °C), Min:98.1 °F (36.7 °C), Max:99.1 °F (37.3 °C) Vital signs stable, afebrile. Exam:  Patient without distress. Abdomen soft, fundus firm at level of umbilicus, non tender. Incision dry and clean without erythema. Lower extremities are  Swelling, no cords or tenderness. Lab/Data Review: CBC: No results found for: WBC, HGB, HGBEXT, HCT, HCTEXT, PLT, PLTEXT, HGBEXT, HCTEXT, PLTEXT Assessment and Plan:  Patient appears to be having uncomplicated post- course. Continue routine post-op care and maternal education. Bilateral swelling, normal BP, elevated, ambulate.

## 2018-10-08 VITALS
SYSTOLIC BLOOD PRESSURE: 126 MMHG | OXYGEN SATURATION: 98 % | TEMPERATURE: 98.4 F | DIASTOLIC BLOOD PRESSURE: 66 MMHG | HEART RATE: 87 BPM | RESPIRATION RATE: 18 BRPM

## 2018-10-08 PROCEDURE — 74011250637 HC RX REV CODE- 250/637: Performed by: OBSTETRICS & GYNECOLOGY

## 2018-10-08 RX ORDER — DOCUSATE SODIUM 100 MG/1
100 CAPSULE, LIQUID FILLED ORAL 2 TIMES DAILY
Qty: 60 CAP | Refills: 2 | Status: SHIPPED | OUTPATIENT
Start: 2018-10-08 | End: 2019-01-06

## 2018-10-08 RX ORDER — OXYCODONE AND ACETAMINOPHEN 7.5; 325 MG/1; MG/1
1 TABLET ORAL
Qty: 30 TAB | Refills: 0 | Status: SHIPPED | OUTPATIENT
Start: 2018-10-08

## 2018-10-08 RX ORDER — IBUPROFEN 600 MG/1
600 TABLET ORAL
Qty: 30 TAB | Refills: 0 | Status: SHIPPED | OUTPATIENT
Start: 2018-10-08

## 2018-10-08 RX ADMIN — OXYCODONE HYDROCHLORIDE AND ACETAMINOPHEN 2 TABLET: 7.5; 325 TABLET ORAL at 04:03

## 2018-10-08 RX ADMIN — DOCUSATE SODIUM 100 MG: 100 CAPSULE, LIQUID FILLED ORAL at 09:18

## 2018-10-08 RX ADMIN — SIMETHICONE CHEW TAB 80 MG 80 MG: 80 TABLET ORAL at 11:26

## 2018-10-08 RX ADMIN — KETOROLAC TROMETHAMINE 10 MG: 10 TABLET, FILM COATED ORAL at 11:25

## 2018-10-08 RX ADMIN — KETOROLAC TROMETHAMINE 10 MG: 10 TABLET, FILM COATED ORAL at 05:05

## 2018-10-08 RX ADMIN — SIMETHICONE CHEW TAB 80 MG 80 MG: 80 TABLET ORAL at 09:18

## 2018-10-08 RX ADMIN — OXYCODONE HYDROCHLORIDE AND ACETAMINOPHEN 2 TABLET: 7.5; 325 TABLET ORAL at 09:18

## 2018-10-08 NOTE — DISCHARGE INSTRUCTIONS
Section: What to Expect at [address] [place] [address]    A  section, or , is surgery to deliver your baby through a cut, called an incision, that the doctor makes in your lower belly and uterus. You may have some pain in your lower belly and need pain medicine for 1 to 2 weeks. You can expect some vaginal bleeding for several weeks. You will probably need about 6 weeks to fully recover. It is important to take it easy while the incision is healing. Avoid heavy lifting, strenuous activities, or exercises that strain the belly muscles while you are recovering. Ask a family member or friend for help with housework, cooking, and shopping. This care sheet gives you a general idea about how long it will take for you to recover. But each person recovers at a different pace. Follow the steps below to get better as quickly as possible. How can you care for yourself at home? Activity    · Rest when you feel tired. Getting enough sleep will help you recover.     · Try to walk each day. Start by walking a little more than you did the day before. Bit by bit, increase the amount you walk. Walking boosts blood flow and helps prevent pneumonia, constipation, and blood clots.     · Avoid strenuous activities, such as bicycle riding, jogging, weightlifting, and aerobic exercise, for 6 weeks or until your doctor says it is okay.     · Until your doctor says it is okay, do not lift anything heavier than your baby.     · Do not do sit-ups or other exercises that strain the belly muscles for 6 weeks or until your doctor says it is okay.     · Hold a pillow over your incision when you cough or take deep breaths. This will support your belly and decrease your pain.     · You may shower as usual. Pat the incision dry when you are done.     · You will have some vaginal bleeding. Wear sanitary pads.  Do not douche or use tampons until your doctor says it is okay.     · Ask your doctor when you can drive again.     · You will probably need to take at least 6 weeks off work. It depends on the type of work you do and how you feel.     · Ask your doctor when it is okay for you to have sex. Diet    · You can eat your normal diet. If your stomach is upset, try bland, low-fat foods like plain rice, broiled chicken, toast, and yogurt.     · Drink plenty of fluids (unless your doctor tells you not to).     · You may notice that your bowel movements are not regular right after your surgery. This is common. Try to avoid constipation and straining with bowel movements. You may want to take a fiber supplement every day. If you have not had a bowel movement after a couple of days, ask your doctor about taking a mild laxative.     · If you are breastfeeding, limit alcohol. Alcohol can cause a lack of energy and other health problems for the baby when a breastfeeding woman drinks heavily. It can also get in the way of a mom's ability to feed her baby or to care for the child in other ways. There isn't a lot of research about exactly how much alcohol can harm a baby. Having no alcohol is the safest choice for your baby. If you choose to have a drink now and then, have only one drink, and limit the number of occasions that you have a drink. Wait to breastfeed at least 2 hours after you have a drink to reduce the amount of alcohol the baby may get in the milk. Medicines    · Your doctor will tell you if and when you can restart your medicines. He or she will also give you instructions about taking any new medicines.     · If you take blood thinners, such as warfarin (Coumadin), clopidogrel (Plavix), or aspirin, be sure to talk to your doctor. He or she will tell you if and when to start taking those medicines again. Make sure that you understand exactly what your doctor wants you to do.     · Take pain medicines exactly as directed. ¨ If the doctor gave you a prescription medicine for pain, take it as prescribed.   ¨ If you are not taking a prescription pain medicine, ask your doctor if you can take an over-the-counter medicine.     · If you think your pain medicine is making you sick to your stomach:  ¨ Take your medicine after meals (unless your doctor has told you not to). ¨ Ask your doctor for a different pain medicine.     · If your doctor prescribed antibiotics, take them as directed. Do not stop taking them just because you feel better. You need to take the full course of antibiotics. Incision care    · If you have strips of tape on the incision, leave the tape on for a week or until it falls off.     · Wash the area daily with warm, soapy water, and pat it dry. Don't use hydrogen peroxide or alcohol, which can slow healing. You may cover the area with a gauze bandage if it weeps or rubs against clothing. Change the bandage every day.     · Keep the area clean and dry. Other instructions    · If you breastfeed your baby, you may be more comfortable while you are healing if you place the baby so that he or she is not resting on your belly. Try tucking your baby under your arm, with his or her body along the side you will be feeding on. Support your baby's upper body with your arm. With that hand you can control your baby's head to bring his or her mouth to your breast. This is sometimes called the football hold. Follow-up care is a key part of your treatment and safety. Be sure to make and go to all appointments, and call your doctor if you are having problems. It's also a good idea to know your test results and keep a list of the medicines you take. When should you call for help? Call 911 anytime you think you may need emergency care.  For example, call if:    · You passed out (lost consciousness).     · You have chest pain, are short of breath, or cough up blood.    Call your doctor now or seek immediate medical care if:    · You have pain that does not get better after you take pain medicine.     · You have severe vaginal bleeding.     · You are dizzy or lightheaded, or you feel like you may faint.     · You have new or worse pain in your belly or pelvis.     · You have loose stitches, or your incision comes open.     · You have symptoms of infection, such as:  ¨ Increased pain, swelling, warmth, or redness. ¨ Red streaks leading from the incision. ¨ Pus draining from the incision. ¨ A fever.     · You have symptoms of a blood clot in your leg (called a deep vein thrombosis), such as:  ¨ Pain in your calf, back of the knee, thigh, or groin. ¨ Redness and swelling in your leg or groin.    Watch closely for changes in your health, and be sure to contact your doctor if:    · You do not get better as expected. Where can you learn more? Go to http://fredi-desean.info/. Enter M806 in the search box to learn more about \" Section: What to Expect at Home. \"  Current as of: 2017  Content Version: 11.8  © 5192-5376 [employer]. Care instructions adapted under license by [employer] (which disclaims liability or warranty for this information). If you have questions about a medical condition or this instruction, always ask your healthcare professional. [address] any warranty or liability for your use of this information.

## 2018-10-08 NOTE — PROGRESS NOTES
Shift assessment complete as noted. Patient denies needs. Questions encouraged and answered. Encouraged to call for needs or concerns. Verbalizes understanding.

## 2018-10-08 NOTE — DISCHARGE SUMMARY
Obstetrical Discharge Summary     Name: Radha Olmos MRN: [id number]  SSN: [id number]    YOB: 1980  Age: 41 y.o. Sex: female      Allergies: Review of patient's allergies indicates no known allergies. Admit Date: 10/4/2018    Discharge Date: 10/8/2018     Admitting Physician: Laura Romeo MD     Attending Physician:  Laura Romeo MD     * Admission Diagnoses: Cytotec  39 weeks gestation of pregnancy  Encounter for planned induction of labor    * Discharge Diagnoses:   Information for the patient's :  Otoniel Christianson [[id number]]   Delivery of a 3.265 kg female infant via , Low Transverse on 10/5/2018 at 8:23 PM  by . Apgars were 9 and 9. Additional Diagnoses:   Hospital Problems as of 10/8/2018  Date Reviewed: 10/5/2018          Codes Class Noted - Resolved POA    AMA (advanced maternal age) multigravida 33+, unspecified trimester ICD-10-CM: O09.529  ICD-9-CM: 659.63  10/5/2018 - Present Unknown        39 weeks gestation of pregnancy ICD-10-CM: Z3A.39  ICD-9-CM: V22.2  10/4/2018 - Present Unknown        * (Principal)Encounter for planned induction of labor ICD-10-CM: Z34.90  ICD-9-CM: V22.1  10/4/2018 - Present Unknown             Lab Results   Component Value Date/Time    ABO/Rh(D) O POSITIVE 10/04/2018 07:26 PM    Rubella, External Immune 2018    GrBStrep, External Negative 2018    There is no immunization history for the selected administration types on file for this patient.     * Procedures:   Procedure(s):   SECTION      [place]  Depression Scale  I have been able to laugh and see the funny side of things: As much as I always could  I have looked forward with enjoyment to things: As much as I ever did  I have blamed myself unnecessarily when things went wrong: Yes, some of the time  I have been anxious or worried for no good reason: Yes, sometimes  I have felt scared or panicky for no very good reason: No, not at all  Things have been getting on top of me: No, I have been coping as well as ever  I have been so unhappy that I have had difficulty sleeping: No, not at all  I have felt sad or miserable: No, not at all  I have been so unhappy that I have been crying: No, never  The thought of harming myself has occurred to me: Never  Total Score: 4    * Discharge Condition: good    * Hospital Course: Normal hospital course following the delivery. On POD #3, patient was meeting all milestones including tolerating a regular diet without nausea or vomiting, ambulating and voiding well. Her pain was well controlled with PO meds and she was having normal lochia. Breastfeeding going well. She was stable for discharge. Blood pressure 126/66, pulse 87, temperature 98.4 °F (36.9 °C), resp. rate 18, SpO2 98 %, currently breastfeeding. Gen- NAD  Abd- soft, FF@ U, NT  Ext- NT, no cords, 2+ edema, neg Homans sign  Kina- scant lochia  Incision- c/d/i, no erythema, induration or separation       * Disposition: Home    Discharge Medications:   Current Discharge Medication List      START taking these medications    Details   oxyCODONE-acetaminophen (PERCOCET 7.5) 7.5-325 mg per tablet Take 1 Tab by mouth every six (6) hours as needed. Max Daily Amount: 4 Tabs. Qty: 30 Tab, Refills: 0    Associated Diagnoses: Postoperative pain      ibuprofen (MOTRIN) 600 mg tablet Take 1 Tab by mouth every six (6) hours as needed for Pain. Qty: 30 Tab, Refills: 0      docusate sodium (COLACE) 100 mg capsule Take 1 Cap by mouth two (2) times a day for 90 days. Qty: 60 Cap, Refills: 2         CONTINUE these medications which have NOT CHANGED    Details   loratadine (CLARITIN) 10 mg tablet Take 10 mg by mouth.      calcium carbonate (TUMS) 200 mg calcium (500 mg) chew Take 1 Tab by mouth daily. acetaminophen (TYLENOL EXTRA STRENGTH) 500 mg tablet Take 1,000 mg by mouth every six (6) hours as needed for Pain.              * Follow-up Care/Patient Instructions:   Activity: Activity as tolerated with the exception of: no lifting >10 lbs or strenuous exercise for 6 weeks, No driving for 2 weeks, No sex/tubs/pools for 6 weeks and No driving while on analgesics  Diet: Regular Diet  Wound Care: Keep wound clean and dry    Follow-up Information     Follow up With Details Comments [address] Alma Haney MD Schedule an appointment as soon as possible for a visit in 6 weeks  [address] [place] [address]. [address] [place] [address], MD   [address] [place] [id number] [phone]             Signed By:  Yary Brice MD     October 8, 2018

## 2018-10-08 NOTE — LACTATION NOTE
In to check on feedings. Baby had just finished feeding on right breast x 30 minutes. Nipple everted but slightly red and small suck blister in center, has been sore per mom. Encouraged mom to apply lanolin post feeding, rotate holds and air dry. Can pump if needed for a couple feeds per day on that breast if needed. Baby rooting still so encouraged mom to put baby on left breast now. Assisted with cross cradle hold as mom primarily hold in football hold for all feeds. Baby latched well. Reviewed signs of good latch with mom. Still feeding now. Continue to feed on demand. Watch output. Questions answered.

## 2018-10-08 NOTE — PROGRESS NOTES
Discharge instructions completed. Patient voiced understanding. Prescriptions given. Patient discharged home via wheelchair.

## 2018-10-12 RX ORDER — FENTANYL CITRATE 50 UG/ML
INJECTION, SOLUTION INTRAMUSCULAR; INTRAVENOUS AS NEEDED
Status: DISCONTINUED | OUTPATIENT
Start: 2018-10-05 | End: 2018-10-12 | Stop reason: HOSPADM

## 2018-10-12 NOTE — ADDENDUM NOTE
Addendum  created 10/12/18 1412 by Jewell Price CRNA Anesthesia Intra Meds edited, Orders acknowledged in Narrator

## 2022-12-20 NOTE — PROGRESS NOTES
EPIDURAL PLACEMENT Dr Curt Lopez at bedside at 1000. ORIANA Moore at bedside at 1003 Assisted pt to sitting up on bedside at 0900. Timeout completed at 1003 with MD, ORIANA and myself at bedside. Test dose given at 1014 . Negative reaction. Dose given at 1020. Pt assisted to lying back in 1020  tilt position. See anesthesia record for details. See vital sign flow sheet for BP. Tolerated procedure well. Otolaryngologist Procedure Text (A): After obtaining clear surgical margins the patient was sent to otolaryngology for surgical repair.  The patient understands they will receive post-surgical care and follow-up from the referring physician's office.

## (undated) DEVICE — KENDALL SCD EXPRESS SLEEVES, KNEE LENGTH, MEDIUM: Brand: KENDALL SCD

## (undated) DEVICE — CATH FOL TY IC BAG 16FR 2000ML -- CONVERT TO ITEM 363158

## (undated) DEVICE — SUTURE MCRYL SZ 1 L36IN ABSRB UD L36MM CT-1 1/2 CIR Y947H

## (undated) DEVICE — PREMIUM WET SKIN PREP TRAY: Brand: MEDLINE INDUSTRIES, INC.

## (undated) DEVICE — PENCIL ES L3M BTTN SWCH S STL HEX LOK BLDE ELECTRD HOLSTER

## (undated) DEVICE — SUTURE PLN GUT SZ 2-0 L27IN ABSRB YELLOWISH TAN L40MM CT 853H

## (undated) DEVICE — SURGICAL PROCEDURE PACK C SECT CDS

## (undated) DEVICE — AMD ANTIMICROBIAL GAUZE SPONGES,12 PLY USP TYPE VII, 0.2% POLYHEXAMETHYLENE BIGUANIDE HCI (PHMB): Brand: CURITY

## (undated) DEVICE — Device: Brand: PORTEX

## (undated) DEVICE — SUTURE PDS II SZ 0 L27IN ABSRB VLT L36MM CT-1 1/2 CIR Z340H

## (undated) DEVICE — SOLUTION IRRIG 1000ML H2O STRL BLT

## (undated) DEVICE — REM POLYHESIVE ADULT PATIENT RETURN ELECTRODE: Brand: VALLEYLAB

## (undated) DEVICE — SUTURE VCRL SZ 0 L36IN ABSRB UD L48MM CTX 1/2 CIR J978H

## (undated) DEVICE — AMD ANTIMICROBIAL NON-ADHERENT PAD,0.2% POLYHEXAMETHYLENE BIGUANIDE HCI (PHMB): Brand: TELFA

## (undated) DEVICE — MEDI-VAC NON-CONDUCTIVE SUCTION TUBING: Brand: CARDINAL HEALTH

## (undated) DEVICE — SUTURE MCRYL SZ 3-0 L36IN ABSRB UD L36MM CT-1 1/2 CIR Y944H

## (undated) DEVICE — SOLUTION IV 1000ML 0.9% SOD CHL

## (undated) DEVICE — SUTURE MCRYL SZ 4-0 L27IN ABSRB UD L19MM PS-2 1/2 CIR PRIM Y426H

## (undated) DEVICE — STERILE POLYISOPRENE POWDER-FREE SURGICAL GLOVES: Brand: PROTEXIS